# Patient Record
Sex: FEMALE | Race: WHITE | Employment: OTHER | URBAN - METROPOLITAN AREA
[De-identification: names, ages, dates, MRNs, and addresses within clinical notes are randomized per-mention and may not be internally consistent; named-entity substitution may affect disease eponyms.]

---

## 2017-01-09 ENCOUNTER — OFFICE VISIT (OUTPATIENT)
Dept: INTERNAL MEDICINE CLINIC | Age: 67
End: 2017-01-09

## 2017-01-09 VITALS
HEIGHT: 65 IN | RESPIRATION RATE: 18 BRPM | OXYGEN SATURATION: 96 % | HEART RATE: 88 BPM | WEIGHT: 204 LBS | BODY MASS INDEX: 33.99 KG/M2 | TEMPERATURE: 98.5 F | SYSTOLIC BLOOD PRESSURE: 142 MMHG | DIASTOLIC BLOOD PRESSURE: 90 MMHG

## 2017-01-09 DIAGNOSIS — E78.2 MIXED HYPERLIPIDEMIA: Primary | ICD-10-CM

## 2017-01-09 DIAGNOSIS — R09.81 NASAL CONGESTION: ICD-10-CM

## 2017-01-09 DIAGNOSIS — I10 ESSENTIAL HYPERTENSION, BENIGN: ICD-10-CM

## 2017-01-09 DIAGNOSIS — F34.1 DYSTHYMIA: ICD-10-CM

## 2017-01-09 DIAGNOSIS — L30.9 DERMATITIS: ICD-10-CM

## 2017-01-09 DIAGNOSIS — C20 RECTAL CANCER (HCC): ICD-10-CM

## 2017-01-09 RX ORDER — FLUTICASONE PROPIONATE 50 MCG
2 SPRAY, SUSPENSION (ML) NASAL
Qty: 1 BOTTLE | COMMUNITY
Start: 2017-01-09 | End: 2017-04-19 | Stop reason: SDUPTHER

## 2017-01-09 RX ORDER — MOMETASONE FUROATE 1 MG/G
CREAM TOPICAL DAILY
Qty: 15 G | Refills: 3 | Status: SHIPPED | OUTPATIENT
Start: 2017-01-09 | End: 2018-06-11 | Stop reason: SDUPTHER

## 2017-01-09 RX ORDER — GLIPIZIDE 10 MG/1
TABLET, FILM COATED, EXTENDED RELEASE ORAL
Qty: 30 TAB | Refills: 11 | Status: SHIPPED | OUTPATIENT
Start: 2017-01-09 | End: 2017-01-11 | Stop reason: SDUPTHER

## 2017-01-09 RX ORDER — PIOGLITAZONEHYDROCHLORIDE 15 MG/1
15 TABLET ORAL DAILY
Qty: 30 TAB | Refills: 10 | Status: SHIPPED | OUTPATIENT
Start: 2017-01-09 | End: 2017-01-11 | Stop reason: SDUPTHER

## 2017-01-09 NOTE — MR AVS SNAPSHOT
Visit Information Date & Time Provider Department Dept. Phone Encounter #  
 1/9/2017  3:25 PM Neo Amaya, [address] [facility] Internal Medicine [phone] [id number] Upcoming Health Maintenance Date Due Hepatitis C Screening 1950 DTaP/Tdap/Td series (1 - Tdap) 9/25/1972 ZOSTER VACCINE AGE 60> 9/25/2011 MICROALBUMIN Q1 9/23/2014 LIPID PANEL Q1 9/23/2014 HEMOGLOBIN A1C Q6M 9/26/2014 INFLUENZA AGE 9 TO ADULT 8/1/2016 OSTEOPOROSIS SCREENING (DEXA) 9/25/2016 Pneumococcal 65+ High/Highest Risk (1 of 2 - PCV13) 9/25/2016 MEDICARE YEARLY EXAM 9/25/2016 FOOT EXAM Q1 10/29/2016 EYE EXAM RETINAL OR DILATED Q1 11/2/2016 BREAST CANCER SCRN MAMMOGRAM 5/12/2017 GLAUCOMA SCREENING Q2Y 11/2/2017 COLONOSCOPY 10/18/2026 Allergies as of 1/9/2017  Review Complete On: 1/9/2017 By: Neo Amaya MD  
  
 Severity Noted Reaction Type Reactions Clindamycin  03/10/2010    Other (comments)  
 colitis Codeine  09/25/2009    Other (comments)  
 mental  
 Crestor [Rosuvastatin]  09/29/2009    Myalgia Metformin  10/03/2013    Diarrhea Pcn [Penicillins]  09/25/2009    Hives Vicodin [Hydrocodone-acetaminophen]  09/25/2009    Other (comments)  
 hallucinations Current Immunizations  Never Reviewed No immunizations on file. Not reviewed this visit You Were Diagnosed With   
  
 Codes Comments Mixed hyperlipidemia    -  Primary ICD-10-CM: D31.2 ICD-9-CM: 272.2 Essential hypertension, benign     ICD-10-CM: I10 
ICD-9-CM: 401.1 Uncontrolled type 2 diabetes mellitus without complication, without long-term current use of insulin ([facility]     ICD-10-CM: E11.65 ICD-9-CM: 250.02 Dermatitis     ICD-10-CM: L30.9 ICD-9-CM: 692.9 Rectal cancer ([facility]     ICD-10-CM: C20 
ICD-9-CM: 154.1 Dysthymia     ICD-10-CM: F34.1 ICD-9-CM: 300.4 Nasal congestion     ICD-10-CM: R09.81 ICD-9-CM: 478.19 Vitals BP Pulse Temp Resp Height(growth percentile) Weight(growth percentile) 142/90 88 98.5 °F (36.9 °C) (Oral) 18 5' 5\" (1.651 m) 204 lb (92.5 kg) SpO2 BMI OB Status Smoking Status 96% 33.95 kg/m2 Postmenopausal Former Smoker BMI and BSA Data Body Mass Index Body Surface Area 33.95 kg/m 2 2.06 m 2 Preferred Pharmacy Pharmacy Name Phone [address], [facility] [place] [address] [phone] Your Updated Medication List  
  
   
This list is accurate as of: 1/9/17  4:14 PM.  Always use your most recent med list.  
  
  
  
  
 ALPRAZolam 0.5 mg tablet Commonly known as:  XANAX  
TAKE 1 TABLET BY MOUTH THREE TIMES DAILY AS NEEDED FOR ANXIETY  
  
 amitriptyline 100 mg tablet Commonly known as:  ELAVIL TAKE ONE TABLET AT BEDTIME  
  
 amLODIPine-benazepril 5-20 mg per capsule Commonly known as:  LOTREL  
TAKE 1 CAPSULE BY MOUTH DAILY  
  
 aspirin delayed-release 81 mg tablet Take 81 mg by mouth. 2 po qd  
  
 butalbital-acetaminophen-caffeine -40 mg per tablet Commonly known as:  FIORICET, ESGIC  
TAKE 1 TABLET BY MOUTH EVERY 4 HOURS AS NEEDED FOR HEADACHE  
  
 dicyclomine 10 mg capsule Commonly known as:  BENTYL Take 10 mg by mouth daily. escitalopram oxalate 10 mg tablet Commonly known as:  [place] TAKE 1 TABLET BY MOUTH DAILY  
  
 fluticasone 50 mcg/actuation nasal spray Commonly known as:  Meera Shames 2 Sprays by Both Nostrils route daily as needed for Rhinitis. glipiZIDE SR 10 mg CR tablet Commonly known as:  GLUCOTROL XL  
TAKE ONE TABLET BY MOUTH DAILY  
  
 mometasone 0.1 % topical cream  
Commonly known as:  Zara July Apply  to affected area daily. multivitamin,tx-iron-ca-min 27-0.4 mg Tab Commonly known as:  THERA-M w/ IRON Take 1 Tab by mouth daily. omega-3 fatty acids-vitamin e 1,000 mg Cap Commonly known as:  FISH OIL  
 Take 1,000 mg by mouth four (4) times daily. pioglitazone 15 mg tablet Commonly known as:  ACTOS Take 1 Tab by mouth daily. For diabetes Prescriptions Printed Refills  
 pioglitazone (ACTOS) 15 mg tablet 10 Sig: Take 1 Tab by mouth daily. For diabetes Class: Print Route: Oral  
 glipiZIDE SR (GLUCOTROL XL) 10 mg CR tablet 11 Sig: TAKE ONE TABLET BY MOUTH DAILY Class: Print  
 mometasone (ELOCON) 0.1 % topical cream 3 Sig: Apply  to affected area daily. Class: Print Route: Topical  
  
We Performed the Following CBC WITH AUTOMATED DIFF [70093 CPT(R)] HEMOGLOBIN A1C WITH EAG [93843 CPT(R)] HEPATIC FUNCTION PANEL [69121 CPT(R)] LIPID PANEL [79577 CPT(R)] METABOLIC PANEL, BASIC [60750 CPT(R)] TSH 3RD GENERATION [81039 CPT(R)] URINALYSIS W/ RFLX MICROSCOPIC [99032 CPT(R)] Introducing [address]! [facility] introduces [email or web address] patient portal. Now you can access parts of your medical record, email your doctor's office, and request medication refills online. 1. In your internet browser, go to [email or web address] 2. Click on the First Time User? Click Here link in the Sign In box. You will see the New Member Sign Up page. 3. Enter your [email or web address] Access Code exactly as it appears below. You will not need to use this code after youve completed the sign-up process. If you do not sign up before the expiration date, you must request a new code. · [email or web address] Access Code: [email or web address] Expires: 1/16/2017  9:01 AM 
 
4. Enter the last four digits of your Social Security Number (xxxx) and Date of Birth (mm/dd/yyyy) as indicated and click Submit. You will be taken to the next sign-up page. 5. Create a Actixt ID. This will be your [email or web address] login ID and cannot be changed, so think of one that is secure and easy to remember. 6. Create a Actixt password. You can change your password at any time. 7. Enter your Password Reset Question and Answer. This can be used at a later time if you forget your password. 8. Enter your e-mail address. You will receive e-mail notification when new information is available in [address] 9. Click Sign Up. You can now view and download portions of your medical record. 10. Click the Download Summary menu link to download a portable copy of your medical information. If you have questions, please visit the Frequently Asked Questions section of the [employer] website. Remember, [employer]ar is NOT to be used for urgent needs. For medical emergencies, dial 911. Now available from your iPhone and Android! Please provide this summary of care documentation to your next provider. Your primary care clinician is listed as CHRIS BARRAZA. If you have any questions after today's visit, please call [phone].

## 2017-01-09 NOTE — PROGRESS NOTES
HISTORY OF PRESENT ILLNESS  Isatu Spears is a 73 y.o. female. HPI Kenyetta is seen today after a long absence for follow up of hypertension, diabetes, and other concerns. 1. Ventral hernia. This bothers her somewhat but not debilitating. Due to the extent of surgery that would be required, she declines treatment for now. 2. Essential hypertension. Fair readings, will follow for now. 3. Mixed hyperlipidemia, Diabetes type 2 uncontrolled without complication without long term insulin usage. Overdue for endocrinology follow up as well as for cholesterol recheck. Strongly encouraged her to get labs done and follow up as directed. 4. Preventive medicine. Up to date with gyn care and bone density study as well as colonoscopy. Review of systems notable for her feeling good. Social history notable for her being very pleased with her new [employer] business, [employer]. Her  Morgan Hwang helps her with the business. MedDATA/gwo         Review of Systems   Constitutional: Positive for weight loss. Respiratory: Negative. Cardiovascular: Negative for chest pain, palpitations, leg swelling and PND. Musculoskeletal: Negative for myalgias. Neurological: Negative for focal weakness. Physical Exam   Constitutional: She appears well-nourished. Neck: Carotid bruit is not present. Cardiovascular: Normal rate and regular rhythm. Exam reveals no gallop and no friction rub. No murmur heard. Pulmonary/Chest: Effort normal and breath sounds normal. No respiratory distress. Musculoskeletal: She exhibits no edema. Nursing note and vitals reviewed. ASSESSMENT and PLAN  Padmini Stauffer was seen today for hypertension and cholesterol problem.     Diagnoses and all orders for this visit:    Mixed hyperlipidemia  -     CBC WITH AUTOMATED DIFF  -     LIPID PANEL  -     HEPATIC FUNCTION PANEL  -     TSH 3RD GENERATION    Essential hypertension, benign- follow    Uncontrolled type 2 diabetes mellitus without complication, without long-term current use of insulin ([facility])  -     METABOLIC PANEL, BASIC  -     URINALYSIS W/ RFLX MICROSCOPIC  -     pioglitazone (ACTOS) 15 mg tablet; Take 1 Tab by mouth daily. For diabetes  -     glipiZIDE SR (GLUCOTROL XL) 10 mg CR tablet; TAKE ONE TABLET BY MOUTH DAILY  -     HEMOGLOBIN A1C WITH EAG    Dermatitis  -     mometasone (ELOCON) 0.1 % topical cream; Apply  to affected area daily. Rectal cancer [facility])- See specialists  as directed. Dysthymia - Continue current regimen of prescription and / or OTC medications     Nasal congestion- reviewed OTC    Other orders  -     Cancel: VITAMIN D, 25 HYDROXY    This has been fully explained to the patient, who indicates understanding.

## 2017-01-11 RX ORDER — GLIPIZIDE 10 MG/1
TABLET, FILM COATED, EXTENDED RELEASE ORAL
Qty: 90 TAB | Refills: 3 | Status: SHIPPED | OUTPATIENT
Start: 2017-01-11 | End: 2017-04-19 | Stop reason: SDUPTHER

## 2017-01-11 RX ORDER — PIOGLITAZONEHYDROCHLORIDE 15 MG/1
TABLET ORAL
Qty: 90 TAB | Refills: 3 | Status: SHIPPED | OUTPATIENT
Start: 2017-01-11 | End: 2017-04-19 | Stop reason: SDUPTHER

## 2017-01-12 DIAGNOSIS — F34.1 DYSTHYMIA: ICD-10-CM

## 2017-01-12 RX ORDER — ESCITALOPRAM OXALATE 10 MG/1
TABLET ORAL
Qty: 30 TAB | Refills: 5 | Status: SHIPPED | OUTPATIENT
Start: 2017-01-12 | End: 2017-04-19 | Stop reason: SDUPTHER

## 2017-02-06 DIAGNOSIS — Z23 ENCOUNTER FOR IMMUNIZATION: Primary | ICD-10-CM

## 2017-02-06 DIAGNOSIS — Z11.59 NEED FOR HEPATITIS C SCREENING TEST: ICD-10-CM

## 2017-03-23 DIAGNOSIS — F34.1 DYSTHYMIA: ICD-10-CM

## 2017-03-24 RX ORDER — AMITRIPTYLINE HYDROCHLORIDE 100 MG/1
TABLET, FILM COATED ORAL
Qty: 90 TAB | Refills: 11 | Status: SHIPPED | OUTPATIENT
Start: 2017-03-24 | End: 2017-04-19 | Stop reason: SDUPTHER

## 2017-03-24 RX ORDER — AMITRIPTYLINE HYDROCHLORIDE 100 MG/1
TABLET, FILM COATED ORAL
Qty: 30 TAB | Refills: 11 | Status: SHIPPED | OUTPATIENT
Start: 2017-03-24 | End: 2017-03-24 | Stop reason: SDUPTHER

## 2017-03-24 RX ORDER — ALPRAZOLAM 0.5 MG/1
TABLET ORAL
Qty: 20 TAB | Refills: 0 | OUTPATIENT
Start: 2017-03-24 | End: 2017-06-15 | Stop reason: SDUPTHER

## 2017-03-24 NOTE — TELEPHONE ENCOUNTER
Phoned in prescription below to patients pharmacy on file - verbal order received : Dr Simon Templeton.

## 2017-04-05 RX ORDER — BUTALBITAL, ACETAMINOPHEN AND CAFFEINE 50; 325; 40 MG/1; MG/1; MG/1
TABLET ORAL
Qty: 30 TAB | Refills: 0 | OUTPATIENT
Start: 2017-04-05 | End: 2017-09-20 | Stop reason: SDUPTHER

## 2017-04-05 NOTE — TELEPHONE ENCOUNTER
Phoned in prescription below to patients pharmacy on file - verbal order received : Dr Barrington Leggett.

## 2017-04-19 DIAGNOSIS — R09.81 NASAL CONGESTION: ICD-10-CM

## 2017-04-19 DIAGNOSIS — E78.5 OTHER AND UNSPECIFIED HYPERLIPIDEMIA: ICD-10-CM

## 2017-04-19 DIAGNOSIS — F34.1 DYSTHYMIA: ICD-10-CM

## 2017-04-19 RX ORDER — GLIPIZIDE 10 MG/1
TABLET, FILM COATED, EXTENDED RELEASE ORAL
Qty: 90 TAB | Refills: 3 | Status: SHIPPED | OUTPATIENT
Start: 2017-04-19 | End: 2018-02-14 | Stop reason: SDUPTHER

## 2017-04-19 RX ORDER — AMLODIPINE AND BENAZEPRIL HYDROCHLORIDE 5; 20 MG/1; MG/1
CAPSULE ORAL
Qty: 90 CAP | Refills: 3 | Status: SHIPPED | OUTPATIENT
Start: 2017-04-19 | End: 2017-09-15 | Stop reason: SDUPTHER

## 2017-04-19 RX ORDER — ESCITALOPRAM OXALATE 10 MG/1
TABLET ORAL
Qty: 90 TAB | Refills: 3 | Status: SHIPPED | OUTPATIENT
Start: 2017-04-19 | End: 2017-09-24 | Stop reason: SDUPTHER

## 2017-04-19 RX ORDER — AMITRIPTYLINE HYDROCHLORIDE 100 MG/1
TABLET, FILM COATED ORAL
Qty: 90 TAB | Refills: 3 | Status: SHIPPED | OUTPATIENT
Start: 2017-04-19 | End: 2017-05-25 | Stop reason: SDUPTHER

## 2017-04-19 RX ORDER — PIOGLITAZONEHYDROCHLORIDE 15 MG/1
TABLET ORAL
Qty: 90 TAB | Refills: 3 | Status: SHIPPED | OUTPATIENT
Start: 2017-04-19 | End: 2018-04-21 | Stop reason: SDUPTHER

## 2017-04-19 RX ORDER — FLUTICASONE PROPIONATE 50 MCG
2 SPRAY, SUSPENSION (ML) NASAL
Qty: 3 BOTTLE | Refills: 3 | Status: SHIPPED | OUTPATIENT
Start: 2017-04-19 | End: 2017-09-20 | Stop reason: ALTCHOICE

## 2017-05-25 RX ORDER — AMITRIPTYLINE HYDROCHLORIDE 100 MG/1
TABLET, FILM COATED ORAL
Qty: 30 TAB | Refills: 10 | Status: SHIPPED | OUTPATIENT
Start: 2017-05-25 | End: 2018-04-21 | Stop reason: SDUPTHER

## 2017-06-15 DIAGNOSIS — F34.1 DYSTHYMIA: ICD-10-CM

## 2017-06-16 RX ORDER — ALPRAZOLAM 0.5 MG/1
TABLET ORAL
Qty: 20 TAB | Refills: 0 | OUTPATIENT
Start: 2017-06-16 | End: 2017-09-20 | Stop reason: SDUPTHER

## 2017-06-16 NOTE — TELEPHONE ENCOUNTER
Phoned in prescription below to patients pharmacy on file - verbal order received : Dr Sonu Ballard.

## 2017-09-18 RX ORDER — AMLODIPINE AND BENAZEPRIL HYDROCHLORIDE 5; 20 MG/1; MG/1
CAPSULE ORAL
Qty: 90 CAP | Refills: 0 | Status: SHIPPED | OUTPATIENT
Start: 2017-09-18 | End: 2017-09-20 | Stop reason: SDUPTHER

## 2017-09-18 NOTE — TELEPHONE ENCOUNTER
Left detailed message MD has approved refill request but she's way overdue for labs and follow up - last seen 1/9/17 - was due back in 3 months 4/2017. Please call to schedule in the near future.

## 2017-09-18 NOTE — TELEPHONE ENCOUNTER
Call- I've approved her medication but she's way overdue for labs and follow up , please schedule in the near future

## 2017-09-19 ENCOUNTER — TELEPHONE (OUTPATIENT)
Dept: INTERNAL MEDICINE CLINIC | Age: 67
End: 2017-09-19

## 2017-09-19 NOTE — TELEPHONE ENCOUNTER
Patient is returning missed call from [place]. Also, patient has made an appt with Dr. Rogerio Lara for 9.20.17.

## 2017-09-19 NOTE — TELEPHONE ENCOUNTER
Spoke with pt - states she wanted to let me know she has scheduled appt tomorrow 9/20/17 at 11:15 am.

## 2017-09-20 ENCOUNTER — HOSPITAL ENCOUNTER (OUTPATIENT)
Dept: LAB | Age: 67
Discharge: HOME | End: 2017-09-20
Payer: MEDICARE

## 2017-09-20 ENCOUNTER — OFFICE VISIT (OUTPATIENT)
Dept: INTERNAL MEDICINE CLINIC | Age: 67
End: 2017-09-20

## 2017-09-20 VITALS
TEMPERATURE: 98.6 F | HEIGHT: 65 IN | RESPIRATION RATE: 16 BRPM | OXYGEN SATURATION: 95 % | DIASTOLIC BLOOD PRESSURE: 96 MMHG | BODY MASS INDEX: 35.69 KG/M2 | HEART RATE: 92 BPM | WEIGHT: 214.2 LBS | SYSTOLIC BLOOD PRESSURE: 150 MMHG

## 2017-09-20 DIAGNOSIS — Z23 ENCOUNTER FOR IMMUNIZATION: ICD-10-CM

## 2017-09-20 DIAGNOSIS — F34.1 DYSTHYMIA: ICD-10-CM

## 2017-09-20 DIAGNOSIS — Z01.00 DIABETIC EYE EXAM (HCC): ICD-10-CM

## 2017-09-20 DIAGNOSIS — G43.709 CHRONIC MIGRAINE WITHOUT AURA WITHOUT STATUS MIGRAINOSUS, NOT INTRACTABLE: ICD-10-CM

## 2017-09-20 DIAGNOSIS — E78.2 MIXED HYPERLIPIDEMIA: ICD-10-CM

## 2017-09-20 DIAGNOSIS — Z11.59 NEED FOR HEPATITIS C SCREENING TEST: ICD-10-CM

## 2017-09-20 DIAGNOSIS — C20 RECTAL CANCER (HCC): ICD-10-CM

## 2017-09-20 DIAGNOSIS — I10 ESSENTIAL HYPERTENSION, BENIGN: ICD-10-CM

## 2017-09-20 DIAGNOSIS — E11.9 DIABETIC EYE EXAM (HCC): ICD-10-CM

## 2017-09-20 DIAGNOSIS — K76.89 OTHER CHRONIC NONALCOHOLIC LIVER DISEASE: ICD-10-CM

## 2017-09-20 DIAGNOSIS — Z78.0 POSTMENOPAUSE: ICD-10-CM

## 2017-09-20 DIAGNOSIS — E11.9 ENCOUNTER FOR DIABETIC FOOT EXAM (HCC): ICD-10-CM

## 2017-09-20 PROCEDURE — 85025 COMPLETE CBC W/AUTO DIFF WBC: CPT

## 2017-09-20 PROCEDURE — 36415 COLL VENOUS BLD VENIPUNCTURE: CPT

## 2017-09-20 PROCEDURE — 82043 UR ALBUMIN QUANTITATIVE: CPT

## 2017-09-20 PROCEDURE — 81003 URINALYSIS AUTO W/O SCOPE: CPT

## 2017-09-20 PROCEDURE — 86803 HEPATITIS C AB TEST: CPT

## 2017-09-20 PROCEDURE — 84443 ASSAY THYROID STIM HORMONE: CPT

## 2017-09-20 PROCEDURE — 80053 COMPREHEN METABOLIC PANEL: CPT

## 2017-09-20 PROCEDURE — 80061 LIPID PANEL: CPT

## 2017-09-20 PROCEDURE — 83036 HEMOGLOBIN GLYCOSYLATED A1C: CPT

## 2017-09-20 RX ORDER — ALPRAZOLAM 0.5 MG/1
TABLET ORAL
Qty: 20 TAB | Refills: 2 | Status: SHIPPED | OUTPATIENT
Start: 2017-09-20 | End: 2018-06-05 | Stop reason: SDUPTHER

## 2017-09-20 RX ORDER — BUTALBITAL, ACETAMINOPHEN AND CAFFEINE 50; 325; 40 MG/1; MG/1; MG/1
TABLET ORAL
Qty: 30 TAB | Refills: 3 | Status: SHIPPED | OUTPATIENT
Start: 2017-09-20 | End: 2019-03-13 | Stop reason: SDUPTHER

## 2017-09-20 RX ORDER — AMLODIPINE AND BENAZEPRIL HYDROCHLORIDE 5; 20 MG/1; MG/1
CAPSULE ORAL
Qty: 90 CAP | Refills: 3 | Status: SHIPPED | OUTPATIENT
Start: 2017-09-20 | End: 2018-10-10 | Stop reason: SDUPTHER

## 2017-09-20 NOTE — MR AVS SNAPSHOT
Visit Information Date & Time Provider Department Dept. Phone Encounter #  
 9/20/2017 11:15 AM Yary Segovia MD [facility] Internal Medicine [phone] [id number] Follow-up Instructions Return in 3 months (on 12/20/2017). Upcoming Health Maintenance Date Due Hepatitis C Screening 1950 DTaP/Tdap/Td series (1 - Tdap) 9/25/1972 MICROALBUMIN Q1 9/23/2014 LIPID PANEL Q1 9/23/2014 HEMOGLOBIN A1C Q6M 9/26/2014 OSTEOPOROSIS SCREENING (DEXA) 9/25/2016 Pneumococcal 65+ High/Highest Risk (1 of 2 - PCV13) 9/25/2016 MEDICARE YEARLY EXAM 9/25/2016 EYE EXAM RETINAL OR DILATED Q1 11/2/2016 BREAST CANCER SCRN MAMMOGRAM 5/12/2017 GLAUCOMA SCREENING Q2Y 11/2/2017 FOOT EXAM Q1 9/20/2018 COLONOSCOPY 10/18/2026 Allergies as of 9/20/2017  Review Complete On: 9/20/2017 By: Yary Segovia MD  
  
 Severity Noted Reaction Type Reactions Clindamycin  03/10/2010    Other (comments)  
 colitis Codeine  09/25/2009    Other (comments)  
 mental  
 Crestor [Rosuvastatin]  09/29/2009    Myalgia Metformin  10/03/2013    Diarrhea Pcn [Penicillins]  09/25/2009    Hives Vicodin [Hydrocodone-acetaminophen]  09/25/2009    Other (comments)  
 hallucinations Current Immunizations  Reviewed on 9/20/2017 Name Date Pneumococcal Polysaccharide (PPSV-23)  Incomplete Zoster Vaccine, Live 1/1/2013 Reviewed by Yary Segovia MD on 9/20/2017 at 12:04 PM  
You Were Diagnosed With   
  
 Codes Comments Uncontrolled type 2 diabetes mellitus without complication, without long-term current use of insulin ([facility]    -  Primary ICD-10-CM: E11.65 ICD-9-CM: 250.02 Need for hepatitis C screening test     ICD-10-CM: Z11.59 
ICD-9-CM: V73.89 Mixed hyperlipidemia     ICD-10-CM: E78.2 ICD-9-CM: 272.2 Encounter for diabetic foot exam ([facility]     ICD-10-CM: E11.9 ICD-9-CM: 250.00 Postmenopause     ICD-10-CM: Z78.0 ICD-9-CM: V49.81 Dysthymia     ICD-10-CM: F34.1 ICD-9-CM: 300.4 Rectal cancer ([facility]     ICD-10-CM: C20 
ICD-9-CM: 154.1 Essential hypertension, benign     ICD-10-CM: I10 
ICD-9-CM: 401.1 Other chronic nonalcoholic liver disease     ICD-10-CM: K76.89 
ICD-9-CM: 571.8 Chronic migraine without aura without status migrainosus, not intractable     ICD-10-CM: I58.412 ICD-9-CM: 346.70 [facility]abetic [facility] exam ([facility]     ICD-10-CM: E11.9, Z01.00 ICD-9-CM: V72.0, 250.00 Encounter for immunization     ICD-10-CM: O99 ICD-9-CM: V03.89 Vitals BP Pulse Temp Resp Height(growth percentile) Weight(growth percentile) (!) 150/98 (BP 1 Location: Right arm, BP Patient Position: Sitting) 92 98.6 °F (37 °C) (Oral) 16 5' 5\" (1.651 m) 214 lb 3.2 oz (97.2 kg) SpO2 BMI OB Status Smoking Status 95% 35.64 kg/m2 Postmenopausal Former Smoker BMI and BSA Data Body Mass Index Body Surface Area  
 35.64 kg/m 2 2.11 m 2 Preferred Pharmacy Pharmacy Name Phone [facility] [address] [facility] [place], [address] [facility] [phone] Your Updated Medication List  
  
   
This list is accurate as of: 9/20/17 12:08 PM.  Always use your most recent med list.  
  
  
  
  
 ALPRAZolam 0.5 mg tablet Commonly known as:  XANAX  
TAKE 1 TABLET BY MOUTH THREE TIMES DAILY AS NEEDED FOR ANXIETY  
  
 amitriptyline 100 mg tablet Commonly known as:  ELAVIL TAKE ONE TABLET BY MOUTH AT BEDTIME  
  
 amLODIPine-benazepril 5-20 mg per capsule Commonly known as:  LOTREL  
TAKE ONE CAPSULE BY MOUTH DAILY  
  
 aspirin delayed-release 81 mg tablet Take 81 mg by mouth. 2 po qd  
  
 butalbital-acetaminophen-caffeine -40 mg per tablet Commonly known as:  FIORICET, ESGIC  
TAKE 1 TABLET BY MOUTH EVERY 4 HOURS AS NEEDED FOR HEADACHE  
  
 dicyclomine 10 mg capsule Commonly known as:  BENTYL Take 10 mg by mouth daily. escitalopram oxalate 10 mg tablet Commonly known as:  Fransisca Hastings TAKE 1 TABLET BY MOUTH DAILY  
  
 glipiZIDE SR 10 mg CR tablet Commonly known as:  GLUCOTROL XL  
TAKE 1 TABLET BY MOUTH DAILY  
  
 mometasone 0.1 % topical cream  
Commonly known as:  Jackquline Mattock Apply  to affected area daily. omega-3 fatty acids-vitamin e 1,000 mg Cap Commonly known as:  FISH OIL Take 1,000 mg by mouth four (4) times daily. pioglitazone 15 mg tablet Commonly known as:  ACTOS  
TAKE 1 TABLET BY MOUTH DAILY XYZAL PO Take  by mouth as needed. Prescriptions Printed Refills  
 amLODIPine-benazepril (LOTREL) 5-20 mg per capsule 3 Sig: TAKE ONE CAPSULE BY MOUTH DAILY Class: Print ALPRAZolam (XANAX) 0.5 mg tablet 2 Sig: TAKE 1 TABLET BY MOUTH THREE TIMES DAILY AS NEEDED FOR ANXIETY Class: Print  
 butalbital-acetaminophen-caffeine (FIORICET, ESGIC) -40 mg per tablet 3 Sig: TAKE 1 TABLET BY MOUTH EVERY 4 HOURS AS NEEDED FOR HEADACHE Class: Print We Performed the Following CBC WITH AUTOMATED DIFF [84692 CPT(R)] HEMOGLOBIN A1C WITH EAG [08350 CPT(R)] HEPATITIS C AB [75331 CPT(R)]  DIABETES FOOT EXAM [HM7 Custom] LIPID PANEL [98379 CPT(R)] METABOLIC PANEL, COMPREHENSIVE [88243 CPT(R)] MICROALBUMIN, UR, RAND W/ MICROALBUMIN/CREA RATIO C6090375 CPT(R)] PNEUMOCOCCAL POLYSACCHARIDE VACCINE, 23-VALENT, ADULT OR IMMUNOSUPPRESSED PT DOSE, [92885 CPT(R)] REFERRAL TO OPHTHALMOLOGY [REF57 Custom] Comments:  
 Please evaluate patient for glacoma screening. TSH 3RD GENERATION [64003 CPT(R)] URINALYSIS W/ RFLX MICROSCOPIC [10826 CPT(R)] Follow-up Instructions Return in 3 months (on 12/20/2017). To-Do List   
 09/22/2017 Imaging:  DEXA BONE DENSITY STUDY AXIAL Referral Information Referral ID Referred By Referred To  
  
 [id number] CHRIS BARRAZA Not Available Visits Status Start Date End Date 1 New Request 9/20/17 9/20/18 If your referral has a status of pending review or denied, additional information will be sent to support the outcome of this decision. Introducing [facility]! Rosalieon Dina introduces [employer] patient portal. Now you can access parts of your medical record, email your doctor's office, and request medication refills online. 1. In your internet browser, go to [email or web address] 2. Click on the First Time User? Click Here link in the Sign In box. You will see the New Member Sign Up page. 3. Enter your Atlantium Access Code exactly as it appears below. You will not need to use this code after youve completed the sign-up process. If you do not sign up before the expiration date, you must request a new code. · Atlantium Access Code: [email or web address] Expires: 12/19/2017 10:33 AM 
 
4. Enter the last four digits of your Social Security Number (xxxx) and Date of Birth (mm/dd/yyyy) as indicated and click Submit. You will be taken to the next sign-up page. 5. Create a Atlantium ID. This will be your Atlantium login ID and cannot be changed, so think of one that is secure and easy to remember. 6. Create a Atlantium password. You can change your password at any time. 7. Enter your Password Reset Question and Answer. This can be used at a later time if you forget your password. 8. Enter your e-mail address. You will receive e-mail notification when new information is available in [address] 9. Click Sign Up. You can now view and download portions of your medical record. 10. Click the Download Summary menu link to download a portable copy of your medical information. If you have questions, please visit the Frequently Asked Questions section of the Atl[employer] website. Remember, [employer]ium is NOT to be used for urgent needs. For medical emergencies, dial 911. Now available from your iPhone and Android! Please provide this summary of care documentation to your next provider. Your primary care clinician is listed as CHRIS BARRAZA. If you have any questions after today's visit, please call [phone].

## 2017-09-20 NOTE — PROGRESS NOTES
HISTORY OF PRESENT ILLNESS  Ama Siegel is a 73 y.o. female. HPI Kenyetta is seen today for follow up of diabetes, hyperlipidemia and other concerns. 1. Diabetes, hyperlipidemia. She is markedly overdue for follow up. She denies any symptoms to suggest out of control diabetes. She is intolerant of statin medications. 2. Hypertension. Elevated readings today. We will check her status in 3 months. 3. Anxiety. Overall stable. 4. Colon cancer. Up to date with specialist follow up. She is 11 years out. She was diagnosed with fatty liver and is seeing a liver specialist.     Review of systems notable for some calf pain on the right. She has had two orthopedic evaluations. It is improved. Social history notable for her having significant worry about her  Escobar Sumner who is having lung cancer in the very near future. MedDATA/gwo       Lipid lowering therapy not prescibed for patient reasons. Review of Systems   Constitutional: Negative for weight loss. Respiratory: Negative. Cardiovascular: Negative for chest pain, palpitations, leg swelling and PND. Musculoskeletal: Positive for joint pain. Negative for myalgias. Neurological: Negative for focal weakness. Physical Exam   Constitutional: She appears well-nourished. No distress. Neck: Carotid bruit is not present. Cardiovascular: Normal rate and regular rhythm. Exam reveals no gallop and no friction rub. No murmur heard. Pulmonary/Chest: Effort normal and breath sounds normal. No respiratory distress. Musculoskeletal: She exhibits no edema. Feet without lesion or ulcer    Nursing note and vitals reviewed. ASSESSMENT and PLAN  Diagnoses and all orders for this visit:    1. Uncontrolled type 2 diabetes mellitus without complication, without long-term current use of insulin (HCC)  -     HEMOGLOBIN A1C WITH EAG  -     URINALYSIS W/ RFLX MICROSCOPIC  -     MICROALBUMIN, UR, RAND W/ MICROALBUMIN/CREA RATIO    2.  Need for hepatitis C screening test  -     HEPATITIS C AB    3. Mixed hyperlipidemia  -     METABOLIC PANEL, COMPREHENSIVE  -     CBC WITH AUTOMATED DIFF  -     LIPID PANEL  -     TSH 3RD GENERATION    4. Encounter for diabetic foot exam ([facility]  -      DIABETES FOOT EXAM    5. Postmenopause- Follow off treatment     6. Dysthymia  -     ALPRAZolam (XANAX) 0.5 mg tablet; TAKE 1 TABLET BY MOUTH THREE TIMES DAILY AS NEEDED FOR ANXIETY    7. Rectal cancer ([facility]- See specialists  as directed. 8. Essential hypertension, benign  -     amLODIPine-benazepril (LOTREL) 5-20 mg per capsule; TAKE ONE CAPSULE BY MOUTH DAILY    9. Other chronic nonalcoholic liver disease- See gastroenterologist as directed     10.  Chronic migraine without aura without status migrainosus, not intractable  -     butalbital-acetaminophen-caffeine (FIORICET, ESGIC) -40 mg per tablet; TAKE 1 TABLET BY MOUTH EVERY 4 HOURS AS NEEDED FOR HEADACHE    11. Diabetic eye exam ([facility]  -     REFERRAL TO OPHTHALMOLOGY    12. Encounter for immunization  -     Pneumococcal Polysaccharide vaccine, 23-Valent, Adult or Immunocompromised

## 2017-09-21 LAB
ALBUMIN SERPL-MCNC: 4.2 G/DL (ref 3.6–4.8)
ALBUMIN/CREAT UR: 12.3 MG/G CREAT (ref 0–30)
ALBUMIN/GLOB SERPL: 1.3 {RATIO} (ref 1.2–2.2)
ALP SERPL-CCNC: 92 IU/L (ref 39–117)
ALT SERPL-CCNC: 62 IU/L (ref 0–32)
APPEARANCE UR: CLEAR
AST SERPL-CCNC: 36 IU/L (ref 0–40)
BASOPHILS # BLD AUTO: 0 X10E3/UL (ref 0–0.2)
BASOPHILS NFR BLD AUTO: 0 %
BILIRUB SERPL-MCNC: 0.6 MG/DL (ref 0–1.2)
BILIRUB UR QL STRIP: NEGATIVE
BUN SERPL-MCNC: 11 MG/DL (ref 8–27)
BUN/CREAT SERPL: 14 (ref 12–28)
CALCIUM SERPL-MCNC: 9.5 MG/DL (ref 8.7–10.3)
CHLORIDE SERPL-SCNC: 99 MMOL/L (ref 96–106)
CHOLEST SERPL-MCNC: 276 MG/DL (ref 100–199)
CO2 SERPL-SCNC: 26 MMOL/L (ref 18–29)
COLOR UR: YELLOW
CREAT SERPL-MCNC: 0.81 MG/DL (ref 0.57–1)
CREAT UR-MCNC: 215.9 MG/DL
EOSINOPHIL # BLD AUTO: 0.3 X10E3/UL (ref 0–0.4)
EOSINOPHIL NFR BLD AUTO: 4 %
ERYTHROCYTE [DISTWIDTH] IN BLOOD BY AUTOMATED COUNT: 13.9 % (ref 12.3–15.4)
EST. AVERAGE GLUCOSE BLD GHB EST-MCNC: 148 MG/DL
GLOBULIN SER CALC-MCNC: 3.3 G/DL (ref 1.5–4.5)
GLUCOSE SERPL-MCNC: 134 MG/DL (ref 65–99)
GLUCOSE UR QL: NEGATIVE
HBA1C MFR BLD: 6.8 % (ref 4.8–5.6)
HCT VFR BLD AUTO: 40.4 % (ref 34–46.6)
HCV AB S/CO SERPL IA: <0.1 S/CO RATIO (ref 0–0.9)
HDLC SERPL-MCNC: 32 MG/DL
HGB BLD-MCNC: 13.8 G/DL (ref 11.1–15.9)
HGB UR QL STRIP: NEGATIVE
IMM GRANULOCYTES # BLD: 0 X10E3/UL (ref 0–0.1)
IMM GRANULOCYTES NFR BLD: 0 %
KETONES UR QL STRIP: NEGATIVE
LDLC SERPL CALC-MCNC: ABNORMAL MG/DL (ref 0–99)
LEUKOCYTE ESTERASE UR QL STRIP: NEGATIVE
LYMPHOCYTES # BLD AUTO: 1.6 X10E3/UL (ref 0.7–3.1)
LYMPHOCYTES NFR BLD AUTO: 23 %
MCH RBC QN AUTO: 31 PG (ref 26.6–33)
MCHC RBC AUTO-ENTMCNC: 34.2 G/DL (ref 31.5–35.7)
MCV RBC AUTO: 91 FL (ref 79–97)
MICRO URNS: NORMAL
MICROALBUMIN UR-MCNC: 26.6 UG/ML
MONOCYTES # BLD AUTO: 0.3 X10E3/UL (ref 0.1–0.9)
MONOCYTES NFR BLD AUTO: 5 %
NEUTROPHILS # BLD AUTO: 4.6 X10E3/UL (ref 1.4–7)
NEUTROPHILS NFR BLD AUTO: 68 %
NITRITE UR QL STRIP: NEGATIVE
PH UR STRIP: 6 [PH] (ref 5–7.5)
PLATELET # BLD AUTO: 281 X10E3/UL (ref 150–379)
POTASSIUM SERPL-SCNC: 4.5 MMOL/L (ref 3.5–5.2)
PROT SERPL-MCNC: 7.5 G/DL (ref 6–8.5)
PROT UR QL STRIP: NORMAL
RBC # BLD AUTO: 4.45 X10E6/UL (ref 3.77–5.28)
SODIUM SERPL-SCNC: 139 MMOL/L (ref 134–144)
SP GR UR: 1.03 (ref 1–1.03)
TRIGL SERPL-MCNC: 427 MG/DL (ref 0–149)
TSH SERPL DL<=0.005 MIU/L-ACNC: 1.23 UIU/ML (ref 0.45–4.5)
UROBILINOGEN UR STRIP-MCNC: 0.2 MG/DL (ref 0.2–1)
VLDLC SERPL CALC-MCNC: ABNORMAL MG/DL (ref 5–40)
WBC # BLD AUTO: 6.9 X10E3/UL (ref 3.4–10.8)

## 2017-09-24 DIAGNOSIS — F34.1 DYSTHYMIA: ICD-10-CM

## 2017-09-25 RX ORDER — ESCITALOPRAM OXALATE 10 MG/1
TABLET ORAL
Qty: 30 TAB | Refills: 0 | Status: SHIPPED | OUTPATIENT
Start: 2017-09-25 | End: 2017-11-17 | Stop reason: SDUPTHER

## 2017-09-26 ENCOUNTER — TELEPHONE (OUTPATIENT)
Dept: INTERNAL MEDICINE CLINIC | Age: 67
End: 2017-09-26

## 2017-09-26 RX ORDER — FENOFIBRATE 200 MG/1
CAPSULE ORAL
Qty: 30 CAP | Refills: 11 | Status: SHIPPED | OUTPATIENT
Start: 2017-09-26 | End: 2018-06-11 | Stop reason: SDUPTHER

## 2017-09-26 NOTE — PROGRESS NOTES
Called and spoke to the pt and informed Labs look great overall , including excellent diabetic control. Only problem is very high cholesterol and triglyceride. Start fenofibrate 200 mg every day, 30 with 11 rf, ov in 3 months to check. Pt states understanding [facility]

## 2017-09-26 NOTE — PROGRESS NOTES
Call- Labs look great overall , including excellent diabetic control. Only problem is very high cholesterol and triglyceride.  Start fenofibrate 200 mg every day, 30 with 11 rf, ov in 3 months to check status

## 2018-02-14 RX ORDER — GLIPIZIDE 10 MG/1
TABLET, FILM COATED, EXTENDED RELEASE ORAL
Qty: 90 TAB | Refills: 0 | Status: SHIPPED | OUTPATIENT
Start: 2018-02-14 | End: 2018-07-29 | Stop reason: SDUPTHER

## 2018-04-21 RX ORDER — PIOGLITAZONEHYDROCHLORIDE 15 MG/1
TABLET ORAL
Qty: 90 TAB | Refills: 1 | Status: SHIPPED | OUTPATIENT
Start: 2018-04-21 | End: 2019-01-11 | Stop reason: SDUPTHER

## 2018-04-21 RX ORDER — AMITRIPTYLINE HYDROCHLORIDE 100 MG/1
TABLET, FILM COATED ORAL
Qty: 30 TAB | Refills: 10 | Status: SHIPPED | OUTPATIENT
Start: 2018-04-21 | End: 2018-06-11 | Stop reason: SDUPTHER

## 2018-06-05 DIAGNOSIS — F34.1 DYSTHYMIA: ICD-10-CM

## 2018-06-06 RX ORDER — ALPRAZOLAM 0.5 MG/1
TABLET ORAL
Qty: 20 TAB | Refills: 1 | OUTPATIENT
Start: 2018-06-06 | End: 2018-11-13 | Stop reason: SDUPTHER

## 2018-06-06 NOTE — TELEPHONE ENCOUNTER
Phoned in prescription below to patients pharmacy on file - verbal order received : Dr Joslyn Dillard.

## 2018-06-11 ENCOUNTER — HOSPITAL ENCOUNTER (OUTPATIENT)
Dept: LAB | Age: 68
Discharge: HOME | End: 2018-06-11
Payer: MEDICARE

## 2018-06-11 ENCOUNTER — OFFICE VISIT (OUTPATIENT)
Dept: INTERNAL MEDICINE CLINIC | Age: 68
End: 2018-06-11

## 2018-06-11 VITALS
DIASTOLIC BLOOD PRESSURE: 80 MMHG | OXYGEN SATURATION: 95 % | BODY MASS INDEX: 33.45 KG/M2 | HEART RATE: 86 BPM | WEIGHT: 200.8 LBS | SYSTOLIC BLOOD PRESSURE: 120 MMHG | HEIGHT: 65 IN | TEMPERATURE: 99.3 F

## 2018-06-11 DIAGNOSIS — G43.709 CHRONIC MIGRAINE WITHOUT AURA WITHOUT STATUS MIGRAINOSUS, NOT INTRACTABLE: ICD-10-CM

## 2018-06-11 DIAGNOSIS — I10 ESSENTIAL HYPERTENSION, BENIGN: ICD-10-CM

## 2018-06-11 DIAGNOSIS — E66.01 SEVERE OBESITY (BMI 35.0-39.9): Primary | ICD-10-CM

## 2018-06-11 DIAGNOSIS — C20 RECTAL CANCER (HCC): ICD-10-CM

## 2018-06-11 DIAGNOSIS — R53.82 CHRONIC FATIGUE: ICD-10-CM

## 2018-06-11 DIAGNOSIS — E78.2 MIXED HYPERLIPIDEMIA: ICD-10-CM

## 2018-06-11 DIAGNOSIS — L30.9 DERMATITIS: ICD-10-CM

## 2018-06-11 PROCEDURE — 36415 COLL VENOUS BLD VENIPUNCTURE: CPT

## 2018-06-11 PROCEDURE — 82043 UR ALBUMIN QUANTITATIVE: CPT

## 2018-06-11 PROCEDURE — 80061 LIPID PANEL: CPT

## 2018-06-11 PROCEDURE — 85025 COMPLETE CBC W/AUTO DIFF WBC: CPT

## 2018-06-11 PROCEDURE — 83036 HEMOGLOBIN GLYCOSYLATED A1C: CPT

## 2018-06-11 PROCEDURE — 84443 ASSAY THYROID STIM HORMONE: CPT

## 2018-06-11 PROCEDURE — 81001 URINALYSIS AUTO W/SCOPE: CPT

## 2018-06-11 PROCEDURE — 80053 COMPREHEN METABOLIC PANEL: CPT

## 2018-06-11 RX ORDER — MOMETASONE FUROATE 1 MG/G
CREAM TOPICAL DAILY
Qty: 15 G | Refills: 3 | Status: SHIPPED | OUTPATIENT
Start: 2018-06-11 | End: 2020-03-16

## 2018-06-11 RX ORDER — AMITRIPTYLINE HYDROCHLORIDE 100 MG/1
TABLET, FILM COATED ORAL
Qty: 90 TAB | Refills: 3 | Status: SHIPPED | OUTPATIENT
Start: 2018-06-11 | End: 2019-01-11 | Stop reason: SDUPTHER

## 2018-06-11 RX ORDER — FENOFIBRATE 200 MG/1
CAPSULE ORAL
Qty: 90 CAP | Refills: 3 | Status: SHIPPED | OUTPATIENT
Start: 2018-06-11 | End: 2019-06-12 | Stop reason: SDUPTHER

## 2018-06-11 NOTE — MR AVS SNAPSHOT
[address] [place] [address] 
[phone] Patient: iDana Dias MRN: [id number] Visit Information Date & Time Provider Department Dept. Phone Encounter #  
 6/11/2018  2:20 PM Eusebia Guerra, [address] [place] [address] Internal Medicine [phone] [id number] Upcoming Health Maintenance Date Due DTaP/Tdap/Td series (1 - Tdap) 9/25/1972 EYE EXAM RETINAL OR DILATED Q1 11/2/2016 GLAUCOMA SCREENING Q2Y 11/2/2017 MEDICARE YEARLY EXAM 3/14/2018 HEMOGLOBIN A1C Q6M 3/20/2018 Influenza Age 5 to Adult 8/1/2018 Pneumococcal 65+ High/Highest Risk (2 of 2 - PCV13) 9/20/2018 FOOT EXAM Q1 9/20/2018 MICROALBUMIN Q1 9/20/2018 LIPID PANEL Q1 9/20/2018 BREAST CANCER SCRN MAMMOGRAM 7/13/2019 COLONOSCOPY 10/18/2026 Allergies as of 6/11/2018  Review Complete On: 6/11/2018 By: Eusebia Guerra MD  
  
 Severity Noted Reaction Type Reactions Clindamycin  03/10/2010    Other (comments)  
 colitis Codeine  09/25/2009    Other (comments)  
 mental  
 Crestor [Rosuvastatin]  09/29/2009    Myalgia Metformin  10/03/2013    Diarrhea Pcn [Penicillins]  09/25/2009    Hives Vicodin [Hydrocodone-acetaminophen]  09/25/2009    Other (comments)  
 hallucinations Current Immunizations  Reviewed on 9/20/2017 Name Date Pneumococcal Polysaccharide (PPSV-23) 9/20/2017 Zoster Vaccine, Live 1/1/2013 Not reviewed this visit You Were Diagnosed With   
  
 Codes Comments Severe obesity (BMI 35.0-39.9) (HCC)    -  Primary ICD-10-CM: E66.01 
ICD-9-CM: 278.01 Dermatitis     ICD-10-CM: L30.9 ICD-9-CM: 692.9 Uncontrolled type 2 diabetes mellitus without complication, without long-term current use of insulin ([facility]     ICD-10-CM: E11.65 ICD-9-CM: 250.02 Mixed hyperlipidemia     ICD-10-CM: E78.2 ICD-9-CM: 272.2 Rectal cancer ([address]     ICD-10-CM: C20 
ICD-9-CM: 154.1 Essential hypertension, benign     ICD-10-CM: I10 
ICD-9-CM: 141. 1 Chronic fatigue     ICD-10-CM: R53.82 
ICD-9-CM: 780.79 Chronic migraine without aura without status migrainosus, not intractable     ICD-10-CM: L71.380 ICD-9-CM: 346.70 Vitals BP Pulse Temp Height(growth percentile) Weight(growth percentile) SpO2  
 120/80 (BP 1 Location: Right arm, BP Patient Position: Sitting) 86 99.3 °F (37.4 °C) (Oral) 5' 5\" (1.651 m) 200 lb 12.8 oz (91.1 kg) 95% BMI OB Status Smoking Status 33.41 kg/m2 Postmenopausal Former Smoker Vitals History BMI and BSA Data Body Mass Index Body Surface Area  
 33.41 kg/m 2 2.04 m 2 Preferred Pharmacy Pharmacy Name Phone Nilesh Maloney [address] [place] [address], [address] [place] [address] [phone] Your Updated Medication List  
  
   
This list is accurate as of 6/11/18  3:09 PM.  Always use your most recent med list.  
  
  
  
  
 ALPRAZolam 0.5 mg tablet Commonly known as:  XANAX  
TAKE ONE TABLET BY MOUTH THREE TIMES A DAY AS NEEDED FOR ANXIETY  
  
 amitriptyline 100 mg tablet Commonly known as:  ELAVIL TAKE ONE TABLET BY MOUTH ONCE DAILY AT BEDTIME  
  
 amLODIPine-benazepril 5-20 mg per capsule Commonly known as:  LOTREL  
TAKE ONE CAPSULE BY MOUTH DAILY  
  
 aspirin delayed-release 81 mg tablet Take 81 mg by mouth. 2 po qd  
  
 butalbital-acetaminophen-caffeine -40 mg per tablet Commonly known as:  FIORICET, ESGIC  
TAKE 1 TABLET BY MOUTH EVERY 4 HOURS AS NEEDED FOR HEADACHE  
  
 dicyclomine 10 mg capsule Commonly known as:  BENTYL Take 10 mg by mouth daily. escitalopram oxalate 10 mg tablet Commonly known as:  Cy Null TAKE ONE TABLET BY MOUTH DAILY  
  
 fenofibrate micronized 200 mg capsule Commonly known as:  LOFIBRA Take 1 tab daily. glipiZIDE SR 10 mg CR tablet Commonly known as:  GLUCOTROL XL  
TAKE ONE TABLET BY MOUTH DAILY  
  
 mometasone 0.1 % topical cream  
 Commonly known as:  Alvena Calion Apply  to affected area daily. NASACORT AQ NA  
by Nasal route daily. omega-3 fatty acids-vitamin e 1,000 mg Cap Commonly known as:  FISH OIL Take 1,000 mg by mouth four (4) times daily. pioglitazone 15 mg tablet Commonly known as:  ACTOS  
TAKE ONE TABLET BY MOUTH DAILY Prescriptions Printed Refills  
 fenofibrate micronized (LOFIBRA) 200 mg capsule 3 Sig: Take 1 tab daily. Class: Print  
 amitriptyline (ELAVIL) 100 mg tablet 3 Sig: TAKE ONE TABLET BY MOUTH ONCE DAILY AT BEDTIME Class: Print  
 mometasone (ELOCON) 0.1 % topical cream 3 Sig: Apply  to affected area daily. Class: Print Route: Topical  
  
We Performed the Following CBC WITH AUTOMATED DIFF [03903 CPT(R)] HEMOGLOBIN A1C WITH EAG [52566 CPT(R)] LIPID PANEL [80657 CPT(R)] METABOLIC PANEL, COMPREHENSIVE [72054 CPT(R)] MICROALBUMIN, UR, RAND W/ MICROALB/CREAT RATIO C1806243 CPT(R)] TSH 3RD GENERATION [53633 CPT(R)] URINALYSIS W/ RFLX MICROSCOPIC [44895 CPT(R)] Introducing [facility]! [employer] introduces [employer] patient portal. Now you can access parts of your medical record, email your doctor's office, and request medication refills online. 1. In your internet browser, go to [email or web address] 2. Click on the First Time User? Click Here link in the Sign In box. You will see the New Member Sign Up page. 3. Enter your [employer] Access Code exactly as it appears below. You will not need to use this code after youve completed the sign-up process. If you do not sign up before the expiration date, you must request a new code. · [employer] Access Code: [email or web address] Expires: 9/9/2018  2:25 PM 
 
4. Enter the last four digits of your Social Security Number (xxxx) and Date of Birth (mm/dd/yyyy) as indicated and click Submit. You will be taken to the next sign-up page. 5. Create a [facility] ID. This will be your [facility] login ID and cannot be changed, so think of one that is secure and easy to remember. 6. Create a [facility] password. You can change your password at any time. 7. Enter your Password Reset Question and Answer. This can be used at a later time if you forget your password. 8. Enter your e-mail address. You will receive e-mail notification when new information is available in [address] 9. Click Sign Up. You can now view and download portions of your medical record. 10. Click the Download Summary menu link to download a portable copy of your medical information. If you have questions, please visit the Frequently Asked Questions section of the [facility] website. Remember, [facility] is NOT to be used for urgent needs. For medical emergencies, dial 911. Now available from your iPhone and Android! Please provide this summary of care documentation to your next provider. Your primary care clinician is listed as CHRIS BARRAZA. If you have any questions after today's visit, please call [phone].

## 2018-06-11 NOTE — PROGRESS NOTES
HISTORY OF PRESENT ILLNESS  Sonal Soriano is a 78 y.o. female. HPI Kenyetta is seen today overdue for routine follow up. Overall, she is doing fairly well. 1. Obesity, 35+ BMI. I strongly encouraged diet and exercise. 2. Rectal cancer. Up to date with specialist follow up and is felt to be in remission. 3. Diabetes, hyperlipidemia. Overdue for routine labs and follow up with her endocrinologist, Dr. Kurtis England. She is also off fenofibrate, I believe, by accident. 4. Hypertension. Stable on current regimen. Review of systems notable for increased abdominal pain when she is tired. She had a recent strep throat in April. She continued with fatigue on and off, but overall remains fairly functional.     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 Review of Systems   Constitutional: Positive for malaise/fatigue. Negative for weight loss. Respiratory: Negative. Cardiovascular: Negative for chest pain, palpitations, leg swelling and PND. Gastrointestinal: Positive for abdominal pain. Musculoskeletal: Negative for myalgias. Neurological: Negative for focal weakness. Physical Exam   Constitutional: She appears well-nourished. Neck: Carotid bruit is not present. Cardiovascular: Normal rate and regular rhythm. Exam reveals no gallop and no friction rub. No murmur heard. Pulmonary/Chest: Effort normal and breath sounds normal. No respiratory distress. Musculoskeletal: She exhibits no edema. Nursing note and vitals reviewed. ASSESSMENT and PLAN  Diagnoses and all orders for this visit:    1. Severe obesity (BMI 35.0-39.9) ([facility]- Diet and exercise     2. Dermatitis  -     mometasone (ELOCON) 0.1 % topical cream; Apply  to affected area daily.     3. Uncontrolled type 2 diabetes mellitus without complication, without long-term current use of insulin (HCC)  -     URINALYSIS W/ RFLX MICROSCOPIC  -     MICROALBUMIN, UR, RAND W/ MICROALB/CREAT RATIO  -     HEMOGLOBIN A1C WITH EAG    4. Mixed hyperlipidemia  -     fenofibrate micronized (LOFIBRA) 200 mg capsule; Take 1 tab daily.  -     LIPID PANEL  -     METABOLIC PANEL, COMPREHENSIVE  -     CBC WITH AUTOMATED DIFF  -     TSH 3RD GENERATION    5. Rectal cancer [facility])- See specialists  as directed. 6. Essential hypertension, benign- Continue current regimen of prescription and / or OTC medications     7. Chronic fatigue- follow, check routine labs as above    8.  Chronic migraine without aura without status migrainosus, not intractable  -     amitriptyline (ELAVIL) 100 mg tablet; TAKE ONE TABLET BY MOUTH ONCE DAILY AT BEDTIME    Other orders  -     MICROSCOPIC EXAMINATION

## 2018-06-12 LAB
ALBUMIN SERPL-MCNC: 4.5 G/DL (ref 3.6–4.8)
ALBUMIN/CREAT UR: 13.3 MG/G CREAT (ref 0–30)
ALBUMIN/GLOB SERPL: 1.3 {RATIO} (ref 1.2–2.2)
ALP SERPL-CCNC: 94 IU/L (ref 39–117)
ALT SERPL-CCNC: 78 IU/L (ref 0–32)
APPEARANCE UR: ABNORMAL
AST SERPL-CCNC: 42 IU/L (ref 0–40)
BACTERIA #/AREA URNS HPF: NORMAL /[HPF]
BASOPHILS # BLD AUTO: 0.1 X10E3/UL (ref 0–0.2)
BASOPHILS NFR BLD AUTO: 1 %
BILIRUB SERPL-MCNC: 0.7 MG/DL (ref 0–1.2)
BILIRUB UR QL STRIP: NEGATIVE
BUN SERPL-MCNC: 17 MG/DL (ref 8–27)
BUN/CREAT SERPL: 20 (ref 12–28)
CALCIUM SERPL-MCNC: 9.6 MG/DL (ref 8.7–10.3)
CASTS URNS QL MICRO: NORMAL /LPF
CHLORIDE SERPL-SCNC: 95 MMOL/L (ref 96–106)
CHOLEST SERPL-MCNC: 305 MG/DL (ref 100–199)
CO2 SERPL-SCNC: 27 MMOL/L (ref 20–29)
COLOR UR: YELLOW
CREAT SERPL-MCNC: 0.83 MG/DL (ref 0.57–1)
CREAT UR-MCNC: 231.4 MG/DL
EOSINOPHIL # BLD AUTO: 0.3 X10E3/UL (ref 0–0.4)
EOSINOPHIL NFR BLD AUTO: 3 %
EPI CELLS #/AREA URNS HPF: NORMAL /HPF
ERYTHROCYTE [DISTWIDTH] IN BLOOD BY AUTOMATED COUNT: 13.9 % (ref 12.3–15.4)
EST. AVERAGE GLUCOSE BLD GHB EST-MCNC: 206 MG/DL
GFR SERPLBLD CREATININE-BSD FMLA CKD-EPI: 74 ML/MIN/1.73
GFR SERPLBLD CREATININE-BSD FMLA CKD-EPI: 85 ML/MIN/1.73
GLOBULIN SER CALC-MCNC: 3.4 G/DL (ref 1.5–4.5)
GLUCOSE SERPL-MCNC: 224 MG/DL (ref 65–99)
GLUCOSE UR QL: ABNORMAL
HBA1C MFR BLD: 8.8 % (ref 4.8–5.6)
HCT VFR BLD AUTO: 42.4 % (ref 34–46.6)
HDLC SERPL-MCNC: 39 MG/DL
HGB BLD-MCNC: 14.7 G/DL (ref 11.1–15.9)
HGB UR QL STRIP: NEGATIVE
IMM GRANULOCYTES # BLD: 0 X10E3/UL (ref 0–0.1)
IMM GRANULOCYTES NFR BLD: 0 %
KETONES UR QL STRIP: ABNORMAL
LDLC SERPL CALC-MCNC: ABNORMAL MG/DL (ref 0–99)
LEUKOCYTE ESTERASE UR QL STRIP: ABNORMAL
LYMPHOCYTES # BLD AUTO: 1.9 X10E3/UL (ref 0.7–3.1)
LYMPHOCYTES NFR BLD AUTO: 20 %
MCH RBC QN AUTO: 30.8 PG (ref 26.6–33)
MCHC RBC AUTO-ENTMCNC: 34.7 G/DL (ref 31.5–35.7)
MCV RBC AUTO: 89 FL (ref 79–97)
MICRO URNS: ABNORMAL
MICROALBUMIN UR-MCNC: 30.8 UG/ML
MONOCYTES # BLD AUTO: 0.5 X10E3/UL (ref 0.1–0.9)
MONOCYTES NFR BLD AUTO: 5 %
MUCOUS THREADS URNS QL MICRO: PRESENT
NEUTROPHILS # BLD AUTO: 6.9 X10E3/UL (ref 1.4–7)
NEUTROPHILS NFR BLD AUTO: 71 %
NITRITE UR QL STRIP: NEGATIVE
PH UR STRIP: 5.5 [PH] (ref 5–7.5)
PLATELET # BLD AUTO: 272 X10E3/UL (ref 150–379)
POTASSIUM SERPL-SCNC: 4.2 MMOL/L (ref 3.5–5.2)
PROT SERPL-MCNC: 7.9 G/DL (ref 6–8.5)
PROT UR QL STRIP: ABNORMAL
RBC # BLD AUTO: 4.78 X10E6/UL (ref 3.77–5.28)
RBC #/AREA URNS HPF: NORMAL /HPF
SODIUM SERPL-SCNC: 139 MMOL/L (ref 134–144)
SP GR UR: 1.03 (ref 1–1.03)
TRIGL SERPL-MCNC: 422 MG/DL (ref 0–149)
TSH SERPL DL<=0.005 MIU/L-ACNC: 2.13 UIU/ML (ref 0.45–4.5)
UROBILINOGEN UR STRIP-MCNC: 0.2 MG/DL (ref 0.2–1)
VLDLC SERPL CALC-MCNC: ABNORMAL MG/DL (ref 5–40)
WBC # BLD AUTO: 9.7 X10E3/UL (ref 3.4–10.8)
WBC #/AREA URNS HPF: NORMAL /HPF

## 2018-06-20 ENCOUNTER — TELEPHONE (OUTPATIENT)
Dept: INTERNAL MEDICINE | Age: 68
End: 2018-06-20

## 2018-06-20 DIAGNOSIS — E78.2 HYPERLIPEMIA, MIXED: Primary | ICD-10-CM

## 2018-06-20 NOTE — TELEPHONE ENCOUNTER
Reviewed lab   - for dm, is now getting back on actos and will follow up with Dr Kaur Crocker  - for extreme hyperlipidemia she's statin intolerant, agrees to coronary artery calcium ct to help guide intensity of treatment    Lipid lowering therapy not prescibed for medical reasons.

## 2018-07-11 ENCOUNTER — TELEPHONE (OUTPATIENT)
Dept: INTERNAL MEDICINE CLINIC | Age: 68
End: 2018-07-11

## 2018-07-11 NOTE — TELEPHONE ENCOUNTER
Left message for patient to return call. Patient eligible for MWV. Speak with Vicky to schedule this please.

## 2018-07-12 DIAGNOSIS — F34.1 DYSTHYMIA: ICD-10-CM

## 2018-07-12 RX ORDER — ESCITALOPRAM OXALATE 10 MG/1
TABLET ORAL
Qty: 30 TAB | Refills: 0 | Status: SHIPPED | OUTPATIENT
Start: 2018-07-12 | End: 2018-08-19 | Stop reason: SDUPTHER

## 2018-07-29 RX ORDER — GLIPIZIDE 10 MG/1
TABLET, FILM COATED, EXTENDED RELEASE ORAL
Qty: 90 TAB | Refills: 0 | Status: SHIPPED | COMMUNITY
Start: 2018-07-29 | End: 2018-11-13 | Stop reason: SDUPTHER

## 2018-10-10 DIAGNOSIS — I10 ESSENTIAL HYPERTENSION, BENIGN: ICD-10-CM

## 2018-10-11 ENCOUNTER — TELEPHONE (OUTPATIENT)
Dept: INTERNAL MEDICINE CLINIC | Age: 68
End: 2018-10-11

## 2018-10-11 RX ORDER — AMLODIPINE AND BENAZEPRIL HYDROCHLORIDE 5; 20 MG/1; MG/1
CAPSULE ORAL
Qty: 90 CAP | Refills: 0 | Status: SHIPPED | OUTPATIENT
Start: 2018-10-11 | End: 2019-01-11 | Stop reason: SDUPTHER

## 2018-10-12 DIAGNOSIS — I10 ESSENTIAL HYPERTENSION, BENIGN: ICD-10-CM

## 2018-10-12 DIAGNOSIS — E78.2 MIXED HYPERLIPIDEMIA: ICD-10-CM

## 2018-11-06 ENCOUNTER — OFFICE VISIT (OUTPATIENT)
Dept: INTERNAL MEDICINE CLINIC | Age: 68
End: 2018-11-06

## 2018-11-06 VITALS
RESPIRATION RATE: 20 BRPM | HEIGHT: 65 IN | SYSTOLIC BLOOD PRESSURE: 122 MMHG | WEIGHT: 219.4 LBS | DIASTOLIC BLOOD PRESSURE: 78 MMHG | BODY MASS INDEX: 36.55 KG/M2 | TEMPERATURE: 98.7 F | OXYGEN SATURATION: 97 % | HEART RATE: 102 BPM

## 2018-11-06 DIAGNOSIS — C20 RECTAL CANCER (HCC): ICD-10-CM

## 2018-11-06 DIAGNOSIS — Z23 ENCOUNTER FOR IMMUNIZATION: ICD-10-CM

## 2018-11-06 DIAGNOSIS — I10 ESSENTIAL HYPERTENSION, BENIGN: Primary | ICD-10-CM

## 2018-11-06 DIAGNOSIS — E78.2 HYPERLIPEMIA, MIXED: ICD-10-CM

## 2018-11-06 DIAGNOSIS — R53.82 CHRONIC FATIGUE: ICD-10-CM

## 2018-11-06 NOTE — PROGRESS NOTES
HISTORY OF PRESENT ILLNESS  Lucio Gorman is a 80 y.o. female. HPI Kenyetta is seen today for follow up of hypertension, diabetes and other problems. 1. Hypertension. Fair readings initially, but improved on recheck. 2. Diabetes. Due for routine labs. 3. Hyperlipidemia. Intolerant of statin drugs. She has risk factors for coronary disease. She has not yet done her coronary artery CT scan to help guide intensity of treatment. She vows she will do this. Review of systems notable for ongoing chronic fatigue. Advised her to have some routine labs checked. She has had a normal thyroid function test within the last year. Past medical history notable for chronic fatigue symptoms. At one point, she did appear to have chronic EBD although she has no specific infection symptoms at this point. Review of Systems   Constitutional: Positive for malaise/fatigue. Negative for weight loss. Respiratory: Negative. Cardiovascular: Negative for chest pain, palpitations, leg swelling and PND. Musculoskeletal: Negative for myalgias. Neurological: Negative for focal weakness. Physical Exam   Constitutional: She appears well-nourished. Neck: Carotid bruit is not present. Cardiovascular: Normal rate and regular rhythm. Exam reveals no gallop and no friction rub. No murmur heard. Pulmonary/Chest: Effort normal and breath sounds normal. No respiratory distress. Musculoskeletal: She exhibits no edema. Nursing note and vitals reviewed. ASSESSMENT and PLAN  Diagnoses and all orders for this visit:    1. Essential hypertension, benign- Continue current regimen of prescription and / or OTC medications     2. Uncontrolled type 2 diabetes mellitus without complication, without long-term current use of insulin ([facility]- check labs as ordered    3. Hyperlipemia, mixed- Proceed with plan as discussed     4. Rectal cancer [facility])- See specialists  as directed.      5. Chronic fatigue- Follow off treatment , Proceed with plan as discussed     6.  Encounter for immunization  -     ADMIN INFLUENZA VIRUS VAC  -     INFLUENZA VACCINE INACTIVATED (IIV), SUBUNIT, ADJUVANTED, IM

## 2018-11-13 DIAGNOSIS — F34.1 DYSTHYMIA: ICD-10-CM

## 2018-11-13 RX ORDER — GLIPIZIDE 10 MG/1
TABLET, FILM COATED, EXTENDED RELEASE ORAL
Qty: 90 TAB | Refills: 0 | Status: SHIPPED | OUTPATIENT
Start: 2018-11-13 | End: 2019-03-25 | Stop reason: SDUPTHER

## 2018-11-13 RX ORDER — ALPRAZOLAM 0.5 MG/1
TABLET ORAL
Qty: 20 TAB | Refills: 0 | OUTPATIENT
Start: 2018-11-13 | End: 2018-12-11 | Stop reason: SDUPTHER

## 2018-11-13 NOTE — TELEPHONE ENCOUNTER
Phoned in prescription below to patients pharmacy on file - verbal order received : Dr Yong Aguirre.

## 2018-12-11 DIAGNOSIS — F34.1 DYSTHYMIA: ICD-10-CM

## 2018-12-12 RX ORDER — ALPRAZOLAM 0.5 MG/1
TABLET ORAL
Qty: 20 TAB | Refills: 0 | OUTPATIENT
Start: 2018-12-12 | End: 2019-02-13 | Stop reason: SDUPTHER

## 2018-12-12 NOTE — TELEPHONE ENCOUNTER
Phoned in prescription below to patients pharmacy on file - verbal order received : Dr Princess Arrieta.

## 2019-01-11 DIAGNOSIS — G43.709 CHRONIC MIGRAINE WITHOUT AURA WITHOUT STATUS MIGRAINOSUS, NOT INTRACTABLE: ICD-10-CM

## 2019-01-11 DIAGNOSIS — I10 ESSENTIAL HYPERTENSION, BENIGN: ICD-10-CM

## 2019-01-11 RX ORDER — AMLODIPINE AND BENAZEPRIL HYDROCHLORIDE 5; 20 MG/1; MG/1
CAPSULE ORAL
Qty: 90 CAP | Refills: 1 | Status: SHIPPED | OUTPATIENT
Start: 2019-01-11 | End: 2019-07-16 | Stop reason: SDUPTHER

## 2019-01-11 RX ORDER — PIOGLITAZONEHYDROCHLORIDE 15 MG/1
TABLET ORAL
Qty: 90 TAB | Refills: 0 | Status: SHIPPED | OUTPATIENT
Start: 2019-01-11 | End: 2019-08-06 | Stop reason: SDUPTHER

## 2019-01-11 RX ORDER — AMITRIPTYLINE HYDROCHLORIDE 100 MG/1
TABLET, FILM COATED ORAL
Qty: 90 TAB | Refills: 1 | Status: SHIPPED | OUTPATIENT
Start: 2019-01-11 | End: 2019-02-19 | Stop reason: SDUPTHER

## 2019-02-13 DIAGNOSIS — F34.1 DYSTHYMIA: ICD-10-CM

## 2019-02-14 RX ORDER — ALPRAZOLAM 0.5 MG/1
TABLET ORAL
Qty: 20 TAB | Refills: 0 | OUTPATIENT
Start: 2019-02-14 | End: 2019-05-09 | Stop reason: SDUPTHER

## 2019-02-15 ENCOUNTER — TELEPHONE (OUTPATIENT)
Dept: INTERNAL MEDICINE CLINIC | Age: 69
End: 2019-02-15

## 2019-02-15 NOTE — TELEPHONE ENCOUNTER
Spoke with pt - states she has been on Amitriptyline for many years. The 100 mg is \"useless in helping her sleep. \" She is overly tired and past 2 nights she took 2 tabs which helped her sleep well. Asked her if she felt drowsy or tired next day? She said she felt rested.  Will forward to MD.

## 2019-02-15 NOTE — TELEPHONE ENCOUNTER
Phoned in prescription below to patients pharmacy on file - verbal order received : Dr Cirilo Edwards.

## 2019-02-15 NOTE — TELEPHONE ENCOUNTER
Advised pt MD is ok to change Rx to equivalent of 200 mg qhs. But before starting this however she should come in for an ecg to make sure there is no rhythm disturbance. This may be a nurse visit. She will come in next week to have done. Once this is done will send in Rx.

## 2019-02-19 ENCOUNTER — CLINICAL SUPPORT (OUTPATIENT)
Dept: INTERNAL MEDICINE CLINIC | Age: 69
End: 2019-02-19

## 2019-02-19 VITALS
HEIGHT: 65 IN | RESPIRATION RATE: 18 BRPM | HEART RATE: 98 BPM | TEMPERATURE: 98.5 F | WEIGHT: 217 LBS | BODY MASS INDEX: 36.15 KG/M2 | OXYGEN SATURATION: 95 %

## 2019-02-19 DIAGNOSIS — G43.709 CHRONIC MIGRAINE WITHOUT AURA WITHOUT STATUS MIGRAINOSUS, NOT INTRACTABLE: ICD-10-CM

## 2019-02-19 DIAGNOSIS — Z79.899 MEDICATION THERAPY CHANGED: Primary | ICD-10-CM

## 2019-02-19 RX ORDER — AMITRIPTYLINE HYDROCHLORIDE 100 MG/1
TABLET, FILM COATED ORAL
Qty: 180 TAB | Refills: 1 | Status: SHIPPED | OUTPATIENT
Start: 2019-02-19 | End: 2019-02-25 | Stop reason: SDUPTHER

## 2019-02-20 LAB
ALBUMIN SERPL-MCNC: 4.4 G/DL (ref 3.6–4.8)
ALP SERPL-CCNC: 67 IU/L (ref 39–117)
ALT SERPL-CCNC: 41 IU/L (ref 0–32)
AST SERPL-CCNC: 23 IU/L (ref 0–40)
BILIRUB DIRECT SERPL-MCNC: 0.13 MG/DL (ref 0–0.4)
BILIRUB SERPL-MCNC: 0.4 MG/DL (ref 0–1.2)
BUN SERPL-MCNC: 18 MG/DL (ref 8–27)
BUN/CREAT SERPL: 22 (ref 12–28)
CALCIUM SERPL-MCNC: 9.4 MG/DL (ref 8.7–10.3)
CHLORIDE SERPL-SCNC: 100 MMOL/L (ref 96–106)
CHOLEST SERPL-MCNC: 274 MG/DL (ref 100–199)
CO2 SERPL-SCNC: 21 MMOL/L (ref 20–29)
CREAT SERPL-MCNC: 0.82 MG/DL (ref 0.57–1)
EST. AVERAGE GLUCOSE BLD GHB EST-MCNC: 157 MG/DL
GLUCOSE SERPL-MCNC: 249 MG/DL (ref 65–99)
HBA1C MFR BLD: 7.1 % (ref 4.8–5.6)
HDLC SERPL-MCNC: 39 MG/DL
LDLC SERPL CALC-MCNC: 165 MG/DL (ref 0–99)
POTASSIUM SERPL-SCNC: 4.8 MMOL/L (ref 3.5–5.2)
PROT SERPL-MCNC: 7.6 G/DL (ref 6–8.5)
SODIUM SERPL-SCNC: 137 MMOL/L (ref 134–144)
TRIGL SERPL-MCNC: 348 MG/DL (ref 0–149)
VLDLC SERPL CALC-MCNC: 70 MG/DL (ref 5–40)

## 2019-02-23 NOTE — PROGRESS NOTES
Call-  1- high fasting sugar but A1c is good at 7.1. Work on Diet and exercise and Continue current regimen of prescription and / or OTC medications   2- cholesterol is extremely high putting her at risk for heart attack and stroke. I strongly encourage the ct scan of the coronary arteries to decide if we need to pursue higher level cholesterol treatments.  I ordered this last Sumi

## 2019-02-25 DIAGNOSIS — G43.709 CHRONIC MIGRAINE WITHOUT AURA WITHOUT STATUS MIGRAINOSUS, NOT INTRACTABLE: ICD-10-CM

## 2019-02-25 RX ORDER — AMITRIPTYLINE HYDROCHLORIDE 100 MG/1
TABLET, FILM COATED ORAL
Qty: 180 TAB | Refills: 1 | Status: SHIPPED | OUTPATIENT
Start: 2019-02-25 | End: 2019-03-01 | Stop reason: SDUPTHER

## 2019-02-25 NOTE — TELEPHONE ENCOUNTER
Per pt's request - called [facility] - spoke to Val Dumont. Advised her pt requesting Amitriptyline to be sent to different pharmacy. Please cancel order there. Resent to [facility] as pt requested.

## 2019-02-25 NOTE — PROGRESS NOTES
Advised pt:  1- high fasting sugar but A1c is good at 7.1. Work on diet and exercise and continue current regimen of prescription and / or OTC medications. 2- cholesterol is extremely high putting her at risk for heart attack and stroke. MD strongly encourage the ct scan of the coronary arteries to decide if we need to pursue higher level cholesterol treatments. MD ordered this last June. Given her number to schedule ([phone]). Pt states [facility] Advised her it went to [facility]. Only pharmacy in her chart. She has requested to send to [facility]

## 2019-03-01 DIAGNOSIS — G43.709 CHRONIC MIGRAINE WITHOUT AURA WITHOUT STATUS MIGRAINOSUS, NOT INTRACTABLE: ICD-10-CM

## 2019-03-02 RX ORDER — AMITRIPTYLINE HYDROCHLORIDE 100 MG/1
TABLET, FILM COATED ORAL
Qty: 180 TAB | Refills: 1 | Status: SHIPPED | OUTPATIENT
Start: 2019-03-02 | End: 2019-08-06 | Stop reason: SDUPTHER

## 2019-03-13 DIAGNOSIS — G43.709 CHRONIC MIGRAINE WITHOUT AURA WITHOUT STATUS MIGRAINOSUS, NOT INTRACTABLE: ICD-10-CM

## 2019-03-13 DIAGNOSIS — F34.1 DYSTHYMIA: ICD-10-CM

## 2019-03-14 RX ORDER — ESCITALOPRAM OXALATE 10 MG/1
TABLET ORAL
Qty: 30 TAB | Refills: 4 | Status: SHIPPED | OUTPATIENT
Start: 2019-03-14 | End: 2019-09-06 | Stop reason: SDUPTHER

## 2019-03-14 RX ORDER — BUTALBITAL, ACETAMINOPHEN AND CAFFEINE 50; 325; 40 MG/1; MG/1; MG/1
TABLET ORAL
Qty: 30 TAB | Refills: 2 | Status: SHIPPED | OUTPATIENT
Start: 2019-03-14 | End: 2020-07-16

## 2019-03-21 DIAGNOSIS — G43.709 CHRONIC MIGRAINE WITHOUT AURA WITHOUT STATUS MIGRAINOSUS, NOT INTRACTABLE: ICD-10-CM

## 2019-03-21 RX ORDER — AMITRIPTYLINE HYDROCHLORIDE 50 MG/1
200 TABLET, FILM COATED ORAL
Qty: 360 TAB | Refills: 1 | Status: SHIPPED | OUTPATIENT
Start: 2019-03-21 | End: 2019-09-12 | Stop reason: SDUPTHER

## 2019-03-26 RX ORDER — GLIPIZIDE 10 MG/1
TABLET, FILM COATED, EXTENDED RELEASE ORAL
Qty: 90 TAB | Refills: 0 | Status: SHIPPED | OUTPATIENT
Start: 2019-03-26 | End: 2019-07-23 | Stop reason: SDUPTHER

## 2019-05-09 DIAGNOSIS — F34.1 DYSTHYMIA: ICD-10-CM

## 2019-05-10 RX ORDER — ALPRAZOLAM 0.5 MG/1
TABLET ORAL
Qty: 20 TAB | Refills: 0 | OUTPATIENT
Start: 2019-05-10 | End: 2019-07-23 | Stop reason: SDUPTHER

## 2019-06-12 DIAGNOSIS — E78.2 MIXED HYPERLIPIDEMIA: ICD-10-CM

## 2019-06-13 RX ORDER — FENOFIBRATE 200 MG/1
CAPSULE ORAL
Qty: 30 CAP | Refills: 0 | Status: SHIPPED | OUTPATIENT
Start: 2019-06-13 | End: 2019-09-18 | Stop reason: SDUPTHER

## 2019-07-12 DIAGNOSIS — I10 ESSENTIAL HYPERTENSION, BENIGN: ICD-10-CM

## 2019-07-12 RX ORDER — AMLODIPINE AND BENAZEPRIL HYDROCHLORIDE 5; 20 MG/1; MG/1
CAPSULE ORAL
Qty: 90 CAP | Refills: 0 | OUTPATIENT
Start: 2019-07-12

## 2019-07-16 DIAGNOSIS — I10 ESSENTIAL HYPERTENSION, BENIGN: ICD-10-CM

## 2019-07-16 RX ORDER — AMLODIPINE AND BENAZEPRIL HYDROCHLORIDE 5; 20 MG/1; MG/1
CAPSULE ORAL
Qty: 30 CAP | Refills: 0 | Status: SHIPPED | OUTPATIENT
Start: 2019-07-16 | End: 2019-08-29 | Stop reason: SDUPTHER

## 2019-07-23 DIAGNOSIS — I10 ESSENTIAL HYPERTENSION, BENIGN: ICD-10-CM

## 2019-07-23 DIAGNOSIS — F34.1 DYSTHYMIA: ICD-10-CM

## 2019-07-23 RX ORDER — GLIPIZIDE 10 MG/1
TABLET, FILM COATED, EXTENDED RELEASE ORAL
Qty: 90 TAB | Refills: 0 | Status: SHIPPED | OUTPATIENT
Start: 2019-07-23 | End: 2019-11-05 | Stop reason: SDUPTHER

## 2019-07-23 RX ORDER — ALPRAZOLAM 0.5 MG/1
TABLET ORAL
Qty: 20 TAB | Refills: 0 | OUTPATIENT
Start: 2019-07-23 | End: 2019-10-10 | Stop reason: SDUPTHER

## 2019-07-23 NOTE — TELEPHONE ENCOUNTER
Phoned in:  ALPRAZolam (XANAX) 0.5 mg tablet          Sig: TAKE ONE TABLET BY MOUTH THREE TIMES A DAY AS NEEDED FOR ANXIETY    Disp:  20 Tab    Refills:  0

## 2019-07-23 NOTE — TELEPHONE ENCOUNTER
Jus Ivy (Self) [phone] (H)     Pt says that her rx for amlodipine was sent to the wrong pharm and she wants it sent to [facility].

## 2019-07-24 NOTE — TELEPHONE ENCOUNTER
Called pt's [facility] in regards to her amlodipine sent to them. Was going to cancel and send to [facility] but was advised pt's  had already picked up. Did not send to [facility]. Pt has appt coming up.

## 2019-08-06 ENCOUNTER — OFFICE VISIT (OUTPATIENT)
Dept: INTERNAL MEDICINE CLINIC | Age: 69
End: 2019-08-06

## 2019-08-06 VITALS
TEMPERATURE: 98.8 F | SYSTOLIC BLOOD PRESSURE: 134 MMHG | WEIGHT: 210.8 LBS | DIASTOLIC BLOOD PRESSURE: 80 MMHG | HEIGHT: 65 IN | HEART RATE: 94 BPM | BODY MASS INDEX: 35.12 KG/M2 | RESPIRATION RATE: 18 BRPM | OXYGEN SATURATION: 98 %

## 2019-08-06 DIAGNOSIS — E78.2 MIXED HYPERLIPIDEMIA: ICD-10-CM

## 2019-08-06 DIAGNOSIS — E78.2 HYPERLIPEMIA, MIXED: ICD-10-CM

## 2019-08-06 DIAGNOSIS — I10 ESSENTIAL HYPERTENSION, BENIGN: ICD-10-CM

## 2019-08-06 DIAGNOSIS — E11.9 CONTROLLED TYPE 2 DIABETES MELLITUS WITHOUT COMPLICATION, WITHOUT LONG-TERM CURRENT USE OF INSULIN (HCC): Primary | ICD-10-CM

## 2019-08-06 DIAGNOSIS — F34.1 DYSTHYMIA: ICD-10-CM

## 2019-08-06 DIAGNOSIS — C20 RECTAL CANCER (HCC): ICD-10-CM

## 2019-08-06 DIAGNOSIS — G43.709 CHRONIC MIGRAINE WITHOUT AURA WITHOUT STATUS MIGRAINOSUS, NOT INTRACTABLE: ICD-10-CM

## 2019-08-06 RX ORDER — PIOGLITAZONEHYDROCHLORIDE 15 MG/1
15 TABLET ORAL DAILY
Qty: 90 TAB | Refills: 1 | Status: SHIPPED | OUTPATIENT
Start: 2019-08-06 | End: 2020-05-21

## 2019-08-06 NOTE — PROGRESS NOTES
HISTORY OF PRESENT ILLNESS  Fracisco Saucedo is a 80 y.o. female. HPI Subjective:  Kenyetta was seen today for follow up of hypertension, diabetes and other problems. 1. Hypertension, stable on current regimen. 2. Diabetes. She is overdue for routine labs. She has decreased her sugar intake, but has not really cut back on starchy carbohydrates. Encouraged this for management of her diabetes. 3. Hyperlipidemia. She has been intolerant of treatment. She did not get the coronary artery CT scan to help guide intensity of treatment. 4. Rectal cancer, up to date with specialist follow up. Current Outpatient Medications   Medication Sig    pioglitazone (ACTOS) 15 mg tablet Take 1 Tab by mouth daily.  glipiZIDE SR (GLUCOTROL XL) 10 mg CR tablet TAKE ONE TABLET BY MOUTH DAILY    ALPRAZolam (XANAX) 0.5 mg tablet TAKE ONE TABLET BY MOUTH THREE TIMES A DAY AS NEEDED FOR ANXIETY    amLODIPine-benazepril (LOTREL) 5-20 mg per capsule TAKE ONE CAPSULE BY MOUTH DAILY    fenofibrate micronized (LOFIBRA) 200 mg capsule TAKE ONE CAPSULE BY MOUTH DAILY    amitriptyline (ELAVIL) 50 mg tablet Take 4 Tabs by mouth nightly.  butalbital-acetaminophen-caffeine (FIORICET, ESGIC) -40 mg per tablet TAKE ONE TABLET BY MOUTH EVERY 4 HOURS AS NEEDED FOR HEADACHE    escitalopram oxalate (LEXAPRO) 10 mg tablet TAKE ONE TABLET BY MOUTH DAILY    triamcinolone acetonide (NASACORT AQ NA) by Nasal route daily.  mometasone (ELOCON) 0.1 % topical cream Apply  to affected area daily. (Patient taking differently: Apply  to affected area daily as needed.)    dicyclomine (BENTYL) 10 mg capsule Take 10 mg by mouth daily.  omega-3 fatty acids-vitamin e (FISH OIL) 1,000 mg Cap Take 1,000 mg by mouth four (4) times daily.  aspirin delayed-release 81 mg tablet Take 81 mg by mouth. 2 po qd      No current facility-administered medications for this visit. Lipid lowering therapy not prescibed for patient reasons.       Review of Systems   Constitutional: Negative for weight loss. Respiratory: Negative. Cardiovascular: Negative for chest pain, palpitations, leg swelling and PND. Musculoskeletal: Negative for myalgias. Neurological: Negative for focal weakness. Physical Exam   Constitutional: She appears well-nourished. Neck: Carotid bruit is not present. Cardiovascular: Normal rate and regular rhythm. Exam reveals no gallop and no friction rub. No murmur heard. Pulmonary/Chest: Effort normal and breath sounds normal. No respiratory distress. Musculoskeletal: She exhibits no edema. Nursing note and vitals reviewed. ASSESSMENT and PLAN  Diagnoses and all orders for this visit:    1. Controlled type 2 diabetes mellitus without complication, without long-term current use of insulin (HCC)  -     CBC WITH AUTOMATED DIFF  -     HEMOGLOBIN A1C WITH EAG  -     METABOLIC PANEL, COMPREHENSIVE  -     pioglitazone (ACTOS) 15 mg tablet; Take 1 Tab by mouth daily. 2. Essential hypertension, benign- Continue current regimen of prescription and / or OTC medications     3. Dysthymia- Continue current regimen of prescription and / or OTC medications     4. Mixed hyperlipidemia    5. Chronic migraine without aura without status migrainosus, not intractable- Continue current regimen of prescription and / or OTC medications     6. Hyperlipemia, mixed- Diet and exercise , Follow off treatment     7.  Rectal cancer [facility])- See gastroenterologist as directed

## 2019-08-27 DIAGNOSIS — E78.2 MIXED HYPERLIPIDEMIA: ICD-10-CM

## 2019-08-27 DIAGNOSIS — I10 ESSENTIAL HYPERTENSION, BENIGN: ICD-10-CM

## 2019-08-27 RX ORDER — FENOFIBRATE 200 MG/1
CAPSULE ORAL
Qty: 30 CAP | Refills: 0 | OUTPATIENT
Start: 2019-08-27

## 2019-08-27 RX ORDER — AMLODIPINE AND BENAZEPRIL HYDROCHLORIDE 5; 20 MG/1; MG/1
CAPSULE ORAL
Qty: 90 CAP | Refills: 0 | OUTPATIENT
Start: 2019-08-27

## 2019-08-29 ENCOUNTER — TELEPHONE (OUTPATIENT)
Dept: INTERNAL MEDICINE CLINIC | Age: 69
End: 2019-08-29

## 2019-08-29 DIAGNOSIS — I10 ESSENTIAL HYPERTENSION, BENIGN: ICD-10-CM

## 2019-08-29 RX ORDER — AMLODIPINE AND BENAZEPRIL HYDROCHLORIDE 5; 20 MG/1; MG/1
CAPSULE ORAL
Qty: 15 CAP | Refills: 0 | Status: SHIPPED | OUTPATIENT
Start: 2019-08-29 | End: 2019-09-18 | Stop reason: SDUPTHER

## 2019-08-29 NOTE — TELEPHONE ENCOUNTER
Pt called to say she will be in on Tuesday to have labs done. She is out of her BP med and wants to know if MD would at least send in that one. Advised her he may send in enough to cover her until she gets labs done. She states she's been on med for years.  Will forward to MD.

## 2019-08-29 NOTE — TELEPHONE ENCOUNTER
Left detailed message on pt's personal vm - MD can't rf meds until labs are done. Please call back if she has any questions.

## 2019-09-04 LAB
ALBUMIN SERPL-MCNC: 4.1 G/DL (ref 3.6–4.8)
ALBUMIN/GLOB SERPL: 1.3 {RATIO} (ref 1.2–2.2)
ALP SERPL-CCNC: 69 IU/L (ref 39–117)
ALT SERPL-CCNC: 52 IU/L (ref 0–32)
AST SERPL-CCNC: 27 IU/L (ref 0–40)
BASOPHILS # BLD AUTO: 0.1 X10E3/UL (ref 0–0.2)
BASOPHILS NFR BLD AUTO: 1 %
BILIRUB SERPL-MCNC: 0.5 MG/DL (ref 0–1.2)
BUN SERPL-MCNC: 20 MG/DL (ref 8–27)
BUN/CREAT SERPL: 30 (ref 12–28)
CALCIUM SERPL-MCNC: 9.2 MG/DL (ref 8.7–10.3)
CHLORIDE SERPL-SCNC: 100 MMOL/L (ref 96–106)
CO2 SERPL-SCNC: 24 MMOL/L (ref 20–29)
CREAT SERPL-MCNC: 0.67 MG/DL (ref 0.57–1)
EOSINOPHIL # BLD AUTO: 0.4 X10E3/UL (ref 0–0.4)
EOSINOPHIL NFR BLD AUTO: 4 %
ERYTHROCYTE [DISTWIDTH] IN BLOOD BY AUTOMATED COUNT: 13.9 % (ref 12.3–15.4)
EST. AVERAGE GLUCOSE BLD GHB EST-MCNC: 151 MG/DL
GLOBULIN SER CALC-MCNC: 3.1 G/DL (ref 1.5–4.5)
GLUCOSE SERPL-MCNC: 111 MG/DL (ref 65–99)
HBA1C MFR BLD: 6.9 % (ref 4.8–5.6)
HCT VFR BLD AUTO: 42.1 % (ref 34–46.6)
HGB BLD-MCNC: 13.5 G/DL (ref 11.1–15.9)
IMM GRANULOCYTES # BLD AUTO: 0 X10E3/UL (ref 0–0.1)
IMM GRANULOCYTES NFR BLD AUTO: 1 %
LYMPHOCYTES # BLD AUTO: 2 X10E3/UL (ref 0.7–3.1)
LYMPHOCYTES NFR BLD AUTO: 22 %
MCH RBC QN AUTO: 29.8 PG (ref 26.6–33)
MCHC RBC AUTO-ENTMCNC: 32.1 G/DL (ref 31.5–35.7)
MCV RBC AUTO: 93 FL (ref 79–97)
MONOCYTES # BLD AUTO: 0.4 X10E3/UL (ref 0.1–0.9)
MONOCYTES NFR BLD AUTO: 5 %
NEUTROPHILS # BLD AUTO: 5.9 X10E3/UL (ref 1.4–7)
NEUTROPHILS NFR BLD AUTO: 67 %
PLATELET # BLD AUTO: 278 X10E3/UL (ref 150–450)
POTASSIUM SERPL-SCNC: 4.7 MMOL/L (ref 3.5–5.2)
PROT SERPL-MCNC: 7.2 G/DL (ref 6–8.5)
RBC # BLD AUTO: 4.53 X10E6/UL (ref 3.77–5.28)
SODIUM SERPL-SCNC: 139 MMOL/L (ref 134–144)
WBC # BLD AUTO: 8.7 X10E3/UL (ref 3.4–10.8)

## 2019-09-04 NOTE — PROGRESS NOTES
Angela -   1- call- Labs look great overall , Continue current regimen of prescription and / or OTC medications   2- have her translate her handwritten note ! Respiratory

## 2019-09-06 DIAGNOSIS — F34.1 DYSTHYMIA: ICD-10-CM

## 2019-09-07 RX ORDER — ESCITALOPRAM OXALATE 10 MG/1
TABLET ORAL
Qty: 30 TAB | Refills: 3 | Status: SHIPPED | OUTPATIENT
Start: 2019-09-07 | End: 2020-01-22 | Stop reason: SDUPTHER

## 2019-09-09 NOTE — PROGRESS NOTES
Pt. Notified of labs and states she did not miss any actos, is working on her diet and they are going to water exercise 2xwk at [facility]. And said no one can read her writing!

## 2019-09-12 RX ORDER — AMITRIPTYLINE HYDROCHLORIDE 50 MG/1
TABLET, FILM COATED ORAL
Qty: 360 TAB | Refills: 1 | Status: SHIPPED | OUTPATIENT
Start: 2019-09-12 | End: 2020-04-07

## 2019-10-10 ENCOUNTER — TELEPHONE (OUTPATIENT)
Dept: INTERNAL MEDICINE CLINIC | Age: 69
End: 2019-10-10

## 2019-10-10 DIAGNOSIS — F34.1 DYSTHYMIA: ICD-10-CM

## 2019-10-10 RX ORDER — ALPRAZOLAM 0.5 MG/1
TABLET ORAL
Qty: 20 TAB | Refills: 0 | OUTPATIENT
Start: 2019-10-10 | End: 2019-12-24

## 2019-10-10 NOTE — TELEPHONE ENCOUNTER
Phoned in prescription below to patients pharmacy on file - verbal order received : Dr Ronalee Skiff.

## 2019-11-05 RX ORDER — GLIPIZIDE 10 MG/1
TABLET, FILM COATED, EXTENDED RELEASE ORAL
Qty: 90 TAB | Refills: 0 | Status: SHIPPED | OUTPATIENT
Start: 2019-11-05 | End: 2020-03-04

## 2019-12-22 DIAGNOSIS — F34.1 DYSTHYMIA: ICD-10-CM

## 2019-12-24 RX ORDER — ALPRAZOLAM 0.5 MG/1
TABLET ORAL
Qty: 20 TAB | Refills: 0 | Status: SHIPPED | OUTPATIENT
Start: 2019-12-24 | End: 2020-02-27 | Stop reason: SDUPTHER

## 2020-01-15 ENCOUNTER — ANESTHESIA (OUTPATIENT)
Dept: ENDOSCOPY | Age: 70
End: 2020-01-15
Payer: MEDICARE

## 2020-01-15 ENCOUNTER — ANESTHESIA EVENT (OUTPATIENT)
Dept: ENDOSCOPY | Age: 70
End: 2020-01-15
Payer: MEDICARE

## 2020-01-15 ENCOUNTER — HOSPITAL ENCOUNTER (OUTPATIENT)
Age: 70
Setting detail: OUTPATIENT SURGERY
Discharge: HOME | End: 2020-01-15
Attending: INTERNAL MEDICINE | Admitting: INTERNAL MEDICINE
Payer: MEDICARE

## 2020-01-15 VITALS
WEIGHT: 210.75 LBS | OXYGEN SATURATION: 97 % | DIASTOLIC BLOOD PRESSURE: 72 MMHG | HEART RATE: 84 BPM | TEMPERATURE: 98.2 F | RESPIRATION RATE: 12 BRPM | BODY MASS INDEX: 35.11 KG/M2 | HEIGHT: 65 IN | SYSTOLIC BLOOD PRESSURE: 132 MMHG

## 2020-01-15 PROCEDURE — 76040000007: Performed by: INTERNAL MEDICINE

## 2020-01-15 PROCEDURE — 74011000250 HC RX REV CODE- 250: Performed by: NURSE ANESTHETIST, CERTIFIED REGISTERED

## 2020-01-15 PROCEDURE — 74011250636 HC RX REV CODE- 250/636: Performed by: NURSE ANESTHETIST, CERTIFIED REGISTERED

## 2020-01-15 PROCEDURE — 76060000032 HC ANESTHESIA 0.5 TO 1 HR: Performed by: INTERNAL MEDICINE

## 2020-01-15 RX ORDER — SODIUM CHLORIDE 9 MG/ML
50 INJECTION, SOLUTION INTRAVENOUS CONTINUOUS
Status: DISCONTINUED | OUTPATIENT
Start: 2020-01-15 | End: 2020-01-15 | Stop reason: HOSPADM

## 2020-01-15 RX ORDER — ATROPINE SULFATE 0.1 MG/ML
0.5 INJECTION INTRAVENOUS
Status: DISCONTINUED | OUTPATIENT
Start: 2020-01-15 | End: 2020-01-15 | Stop reason: HOSPADM

## 2020-01-15 RX ORDER — SODIUM CHLORIDE 9 MG/ML
INJECTION, SOLUTION INTRAVENOUS
Status: DISCONTINUED | OUTPATIENT
Start: 2020-01-15 | End: 2020-01-15 | Stop reason: HOSPADM

## 2020-01-15 RX ORDER — EPINEPHRINE 0.1 MG/ML
1 INJECTION INTRACARDIAC; INTRAVENOUS
Status: DISCONTINUED | OUTPATIENT
Start: 2020-01-15 | End: 2020-01-15 | Stop reason: HOSPADM

## 2020-01-15 RX ORDER — PROPOFOL 10 MG/ML
INJECTION, EMULSION INTRAVENOUS AS NEEDED
Status: DISCONTINUED | OUTPATIENT
Start: 2020-01-15 | End: 2020-01-15 | Stop reason: HOSPADM

## 2020-01-15 RX ORDER — FLUMAZENIL 0.1 MG/ML
0.2 INJECTION INTRAVENOUS
Status: DISCONTINUED | OUTPATIENT
Start: 2020-01-15 | End: 2020-01-15 | Stop reason: HOSPADM

## 2020-01-15 RX ORDER — LIDOCAINE HYDROCHLORIDE 20 MG/ML
INJECTION, SOLUTION EPIDURAL; INFILTRATION; INTRACAUDAL; PERINEURAL AS NEEDED
Status: DISCONTINUED | OUTPATIENT
Start: 2020-01-15 | End: 2020-01-15 | Stop reason: HOSPADM

## 2020-01-15 RX ORDER — SODIUM CHLORIDE 0.9 % (FLUSH) 0.9 %
5-40 SYRINGE (ML) INJECTION EVERY 8 HOURS
Status: DISCONTINUED | OUTPATIENT
Start: 2020-01-15 | End: 2020-01-15 | Stop reason: HOSPADM

## 2020-01-15 RX ORDER — FENTANYL CITRATE 50 UG/ML
25-200 INJECTION, SOLUTION INTRAMUSCULAR; INTRAVENOUS
Status: DISCONTINUED | OUTPATIENT
Start: 2020-01-15 | End: 2020-01-15 | Stop reason: HOSPADM

## 2020-01-15 RX ORDER — SODIUM CHLORIDE 0.9 % (FLUSH) 0.9 %
5-40 SYRINGE (ML) INJECTION AS NEEDED
Status: DISCONTINUED | OUTPATIENT
Start: 2020-01-15 | End: 2020-01-15 | Stop reason: HOSPADM

## 2020-01-15 RX ORDER — NALOXONE HYDROCHLORIDE 0.4 MG/ML
0.4 INJECTION, SOLUTION INTRAMUSCULAR; INTRAVENOUS; SUBCUTANEOUS
Status: DISCONTINUED | OUTPATIENT
Start: 2020-01-15 | End: 2020-01-15 | Stop reason: HOSPADM

## 2020-01-15 RX ORDER — MIDAZOLAM HYDROCHLORIDE 1 MG/ML
.25-5 INJECTION, SOLUTION INTRAMUSCULAR; INTRAVENOUS
Status: DISCONTINUED | OUTPATIENT
Start: 2020-01-15 | End: 2020-01-15 | Stop reason: HOSPADM

## 2020-01-15 RX ORDER — DEXTROMETHORPHAN/PSEUDOEPHED 2.5-7.5/.8
1.2 DROPS ORAL
Status: DISCONTINUED | OUTPATIENT
Start: 2020-01-15 | End: 2020-01-15 | Stop reason: HOSPADM

## 2020-01-15 RX ADMIN — LIDOCAINE HYDROCHLORIDE 60 MG: 20 INJECTION, SOLUTION EPIDURAL; INFILTRATION; INTRACAUDAL; PERINEURAL at 14:10

## 2020-01-15 RX ADMIN — PROPOFOL 40 MG: 10 INJECTION, EMULSION INTRAVENOUS at 14:12

## 2020-01-15 RX ADMIN — PROPOFOL 30 MG: 10 INJECTION, EMULSION INTRAVENOUS at 14:14

## 2020-01-15 RX ADMIN — PROPOFOL 30 MG: 10 INJECTION, EMULSION INTRAVENOUS at 14:13

## 2020-01-15 RX ADMIN — PROPOFOL 50 MG: 10 INJECTION, EMULSION INTRAVENOUS at 14:11

## 2020-01-15 RX ADMIN — PROPOFOL 30 MG: 10 INJECTION, EMULSION INTRAVENOUS at 14:20

## 2020-01-15 RX ADMIN — PROPOFOL 30 MG: 10 INJECTION, EMULSION INTRAVENOUS at 14:17

## 2020-01-15 RX ADMIN — SODIUM CHLORIDE: 900 INJECTION, SOLUTION INTRAVENOUS at 14:01

## 2020-01-15 RX ADMIN — PROPOFOL 30 MG: 10 INJECTION, EMULSION INTRAVENOUS at 14:23

## 2020-01-15 NOTE — DISCHARGE INSTRUCTIONS
[address], [address] [facility]    COLON DISCHARGE INSTRUCTIONS    Brianna Hutchinson  [id number]  1950    Discomfort:  Redness at IV site- apply warm compress to area; if redness or soreness persist- contact your physician  There may be a slight amount of blood passed from the rectum  Gaseous discomfort- walking, belching will help relieve any discomfort  You may not operate a vehicle for 12 hours  You may not engage in an occupation involving machinery or appliances for rest of today  You may not drink alcoholic beverages for at least 12 hours  Avoid making any critical decisions for at least 24 hour  DIET:  You may resume your regular diet - however -  remember your colon is empty and a heavy meal will produce gas. Avoid these foods:  vegetables, fried / greasy foods, carbonated drinks     ACTIVITY:  You may  resume your normal daily activities it is recommended that you spend the remainder of the day resting -  avoid any strenuous activity. CALL M.D.   ANY SIGN OF:   Increasing pain, nausea, vomiting  Abdominal distension (swelling)  New increased bleeding (oral or rectal)  Fever (chills)  Pain in chest area  Bloody discharge from nose or mouth  Shortness of breath      Follow-up Instructions:   Call Dr. Etelvina Brasher for any questions or problems at [phone]   Repeat colonoscopy in 3 years          ENDOSCOPY FINDINGS:   Your colonoscopy was normal.  Telephone # [phone]      Signed By: Etelvina Brasher MD     1/15/2020  2:30 PM       DISCHARGE SUMMARY from Nurse    The following personal items collected during your admission are returned to you:   Dental Appliance: Dental Appliances: None  Vision: Visual Aid: None  Hearing Aid:    Jewelry:    Clothing:    Other Valuables:    Valuables sent to safe:

## 2020-01-15 NOTE — PROCEDURES
[address]  [address] [facility], [address] [place] [address]      Colonoscopy Operative Report    Kendra Glez  [id number]  1950      Procedure Type:   Colonoscopy --diagnostic     Indications:    Personal history of colon cancer (screening only)   Date of last colonoscopy: 3 years ago, Polyps  Yes    Pre-operative Diagnosis: see indication above    Post-operative Diagnosis:  See findings below    :  Jaycob Alejandro MD    Surgical Assistant: None    Implants:  None    Referring Provider: Montse Rosenberg MD      Sedation:  MAC anesthesia Propofol      Procedure Details:  After informed consent was obtained with all risks and benefits of procedure explained and preoperative exam completed, the patient was taken to the endoscopy suite and placed in the left lateral decubitus position. Upon sequential sedation as per above, a digital rectal exam was performed demonstrating internal hemorrhoids. The Olympus videocolonoscope  was inserted in the rectum and carefully advanced to the cecum, which was identified by the ileocecal valve and appendiceal orifice. The cecum was identified by the ileocecal valve and appendiceal orifice. The quality of preparation was excellent. The colonoscope was slowly withdrawn with careful evaluation between folds. Retroflexion in the rectum was completed . Findings:   Rectum: normal  Sigmoid: normal  Descending Colon: normal  Transverse Colon: normal  Ascending Colon: normal  Cecum: normal        Specimen Removed:  none    Complications: None. EBL:  None. Impression:    see findings    Recommendations: --Repeat colonoscopy in 3 years.         Signed By: Jaycob Alejandro MD     1/15/2020  2:27 PM

## 2020-01-15 NOTE — ROUTINE PROCESS
[facility] 1950 
[id number] Situation: 
Verbal report received from: Chelly Tia 
Procedure: Procedure(s): 
COLONOSCOPY Background: 
 
Preoperative diagnosis: HISTORY COLON CANCER, HISTORY OF POLYP Postoperative diagnosis: Normal 
 
:  Dr. Dawit Reed Assistant(s): Endoscopy Technician-1: Laxmi Jordan Endoscopy RN-1: Vahe Hein RN Specimens: * No specimens in log * H. Pylori  no Assessment: 
Intra-procedure medications Anesthesia gave intra-procedure sedation and medications, see anesthesia flow sheet yes Intravenous fluids: NS@ Ct Glaze Vital signs stable Abdominal assessment: round and soft Recommendation: 
Discharge patient per MD order. Family or Friend Permission to share finding with family or friend yes

## 2020-01-15 NOTE — ANESTHESIA POSTPROCEDURE EVALUATION
Post-Anesthesia Evaluation and Assessment    Patient: Estrella Butts MRN: [id number]  SSN: [id number]    YOB: 1950  Age: 77 y.o. Sex: female      I have evaluated the patient and they are stable and ready for discharge from the PACU. Cardiovascular Function/Vital Signs  Visit Vitals  /72   Pulse 84   Temp 36.8 °C (98.2 °F)   Resp 12   Ht 5' 5\" (1.651 m)   Wt 95.6 kg (210 lb 12 oz)   SpO2 97%   Breastfeeding No   BMI 35.07 kg/m²       Patient is status post MAC anesthesia for Procedure(s):  COLONOSCOPY. Nausea/Vomiting: None    Postoperative hydration reviewed and adequate. Pain:  Pain Scale 1: Numeric (0 - 10) (01/15/20 1307)  Pain Intensity 1: 0 (01/15/20 1307)   Managed    Neurological Status: At baseline    Mental Status, Level of Consciousness: Alert and  oriented to person, place, and time    Pulmonary Status:   O2 Device: Room air (01/15/20 1433)   Adequate oxygenation and airway patent    Complications related to anesthesia: None    Post-anesthesia assessment completed. No concerns    Signed By: Brisa Madrigal MD     January 15, 2020              Procedure(s):  COLONOSCOPY. MAC    <BSHSIANPOST>    Vitals Value Taken Time   /72 1/15/2020  2:37 PM   Temp 36.8 °C (98.2 °F) 1/15/2020  2:33 PM   Pulse 85 1/15/2020  2:42 PM   Resp 17 1/15/2020  2:42 PM   SpO2 98 % 1/15/2020  2:42 PM   Vitals shown include unvalidated device data.

## 2020-01-15 NOTE — H&P
[facility]      History and Physical       NAME:  Lindsay Wong   :   1950   MRN:   [id number]             History of Present Illness:  Patient is a 77 y.o. who is seen for h/o colon cancer. PMH:  Past Medical History:   Diagnosis Date    Cancer [facility])     rectal surgery/chemo/xrt    Chronic fatigue     Colon polyps     Diabetes ([address]     type 2    Fatty liver     Headache(784.0)     migraine    Hypercholesterolemia     Hypertension     PCOS (polycystic ovarian syndrome)     Unspecified adverse effect of anesthesia     \"vagal Response during cholecystectomy requiring ICU admission\" no problems with other surgeries       PSH:  Past Surgical History:   Procedure Laterality Date    BREAST SURGERY PROCEDURE UNLISTED      breast reduction    COLONOSCOPY N/A 10/18/2016    COLONOSCOPY performed by Enzo Olszewski, MD at [facility]. [address], COLON, DIAGNOSTIC      rectal resection 3/06    HX CHOLECYSTECTOMY      HX COLECTOMY  2005    HX HEENT  2004    parathyroid gland removed x 2    HX HEENT      lipoma- forehead    HX HERNIA REPAIR      X2 surgical and inguinal    HX HYSTERECTOMY       PT hystorectomy USO    HX ORTHOPAEDIC  2014    r foot ganglion       Allergies: Allergies   Allergen Reactions    Clindamycin Other (comments)     colitis    Codeine Other (comments)     mental    Crestor [Rosuvastatin] Myalgia    Metformin Diarrhea    Pcn [Penicillins] Hives    Vicodin [Hydrocodone-Acetaminophen] Other (comments)     hallucinations       Home Medications:  Prior to Admission Medications   Prescriptions Last Dose Informant Patient Reported? Taking?    ALPRAZolam (XANAX) 0.5 mg tablet 2020 at Unknown time  No Yes   Sig: TAKE ONE TABLET BY MOUTH THREE TIMES A DAY AS NEEDED FOR ANXIETY   amLODIPine-benazepril (LOTREL) 5-20 mg per capsule 2020 at Unknown time  No Yes   Sig: TAKE ONE CAPSULE BY MOUTH DAILY amitriptyline (ELAVIL) 50 mg tablet 1/14/2020 at Unknown time  No Yes   Sig: TAKE 4 TABLETS BY MOUTH NIGHTLY   aspirin delayed-release 81 mg tablet 1/8/2020  Yes No   Sig: Take 81 mg by mouth. 2 po qd    butalbital-acetaminophen-caffeine (FIORICET, ESGIC) -40 mg per tablet 12/15/2019 at Unknown time  No Yes   Sig: TAKE ONE TABLET BY MOUTH EVERY 4 HOURS AS NEEDED FOR HEADACHE   dicyclomine (BENTYL) 10 mg capsule 1/8/2020 at Unknown time  Yes Yes   Sig: Take 10 mg by mouth daily. escitalopram oxalate (LEXAPRO) 10 mg tablet 1/13/2020  No No   Sig: TAKE ONE TABLET BY MOUTH DAILY   fenofibrate micronized (LOFIBRA) 200 mg capsule 1/13/2020  No No   Sig: TAKE ONE CAPSULE BY MOUTH DAILY   glipiZIDE SR (GLUCOTROL XL) 10 mg CR tablet 1/13/2020  No No   Sig: TAKE ONE TABLET BY MOUTH DAILY   mometasone (ELOCON) 0.1 % topical cream Not Taking at Unknown time  No No   Sig: Apply  to affected area daily. Patient taking differently: Apply  to affected area daily as needed. omega-3 fatty acids-vitamin e (FISH OIL) 1,000 mg Cap 1/13/2020  No No   Sig: Take 1,000 mg by mouth four (4) times daily. pioglitazone (ACTOS) 15 mg tablet 1/13/2020  No No   Sig: Take 1 Tab by mouth daily. triamcinolone acetonide (NASACORT AQ NA) 1/14/2020  Yes No   Sig: by Nasal route daily.       Facility-Administered Medications: None       Hospital Medications:  Current Facility-Administered Medications   Medication Dose Route Frequency    0.9% sodium chloride infusion  50 mL/hr IntraVENous CONTINUOUS    sodium chloride (NS) flush 5-40 mL  5-40 mL IntraVENous Q8H    sodium chloride (NS) flush 5-40 mL  5-40 mL IntraVENous PRN    midazolam (VERSED) injection 0.25-5 mg  0.25-5 mg IntraVENous Multiple    fentaNYL citrate (PF) injection  mcg   mcg IntraVENous Multiple    naloxone (NARCAN) injection 0.4 mg  0.4 mg IntraVENous Multiple    flumazenil (ROMAZICON) 0.1 mg/mL injection 0.2 mg  0.2 mg IntraVENous Multiple    simethicone (MYLICON) 50XG/9.7NL oral drops 80 mg  1.2 mL Oral Multiple    atropine injection 0.5 mg  0.5 mg IntraVENous ONCE PRN    EPINEPHrine (ADRENALIN) 0.1 mg/mL syringe 1 mg  1 mg Endoscopically ONCE PRN       Social History:  Social History     Tobacco Use    Smoking status: Former Smoker     Packs/day: 1.00     Years: 20.00     Pack years: 20.00     Last attempt to quit: 1991     Years since quittin.1    Smokeless tobacco: Never Used   Substance Use Topics    Alcohol use: No       Family History:  Family History   Problem Relation Age of Onset    Cancer Mother         gallbladder    Stroke Father         cerebral hemorrhage    Cancer Brother         leukemia             Review of Systems:      Constitutional: negative fever, negative chills, negative weight loss  Eyes:   negative visual changes  ENT:   negative sore throat, tongue or lip swelling  Respiratory:  negative cough, negative dyspnea  Cards:  negative for chest pain, palpitations, lower extremity edema  GI:   See HPI  :  negative for frequency, dysuria  Integument:  negative for rash and pruritus  Heme:  negative for easy bruising and gum/nose bleeding  Musculoskel: negative for myalgias,  back pain and muscle weakness  Neuro: negative for headaches, dizziness, vertigo  Psych:  negative for feelings of anxiety, depression       Objective:     Patient Vitals for the past 8 hrs:   BP Temp Pulse Resp SpO2 Height Weight   01/15/20 1307 161/90 98.3 °F (36.8 °C) 86 18 96 % 5' 5\" (1.651 m) 95.6 kg (210 lb 12 oz)     No intake/output data recorded. No intake/output data recorded. EXAM:     NEURO-a&o   HEENT-wnl   LUNGS-clear    COR-regular rate and rhythym     ABD-soft , no tenderness, no rebound, good bs     EXT-no edema     Data Review     No results for input(s): WBC, HGB, HCT, PLT, HGBEXT, HCTEXT, PLTEXT in the last 72 hours. No results for input(s): NA, K, CL, CO2, BUN, CREA, GLU, PHOS, CA in the last 72 hours.   No results for input(s): SGOT, GPT, AP, TBIL, TP, ALB, GLOB, GGT, AML, LPSE in the last 72 hours. No lab exists for component: AMYP, HLPSE  No results for input(s): INR, PTP, APTT, INREXT in the last 72 hours. Assessment:   · H/o colon cancer     Patient Active Problem List   Diagnosis Code    Essential hypertension, benign I10    Chronic fatigue R53.82    Benign neoplasm of colon D12.6    Migraine headache G43.909    Other chronic nonalcoholic liver disease M60.67    Contact dermatitis and other eczema, due to unspecified cause L25.9    Other acne L70.8    Stasis edema I87.309    Dysthymia F34.1    Diabetes mellitus type 2, uncontrolled, without complications ([facility] [address] N39.29    Mixed hyperlipidemia E78.2    Rectal cancer ([facility] [address] C20    Severe obesity (BMI 35.0-39. 9) E66.01           Plan:   · Endoscopic procedure with sedation     Signed By: Jaycob Alejandro MD     1/15/2020  2:07 PM

## 2020-01-15 NOTE — ANESTHESIA PREPROCEDURE EVALUATION
Relevant Problems   No relevant active problems       Anesthetic History   No history of anesthetic complications            Review of Systems / Medical History  Patient summary reviewed, nursing notes reviewed and pertinent labs reviewed    Pulmonary  Within defined limits                 Neuro/Psych         Psychiatric history     Cardiovascular    Hypertension              Exercise tolerance: >4 METS     GI/Hepatic/Renal           Liver disease     Endo/Other    Diabetes    Obesity     Other Findings              Physical Exam    Airway  Mallampati: II  TM Distance: > 6 cm  Neck ROM: normal range of motion   Mouth opening: Normal     Cardiovascular    Rhythm: regular  Rate: normal         Dental  No notable dental hx       Pulmonary  Breath sounds clear to auscultation               Abdominal         Other Findings            Anesthetic Plan    ASA: 2  Anesthesia type: MAC          Induction: Intravenous  Anesthetic plan and risks discussed with: Patient

## 2020-01-21 DIAGNOSIS — F34.1 DYSTHYMIA: ICD-10-CM

## 2020-01-22 RX ORDER — ESCITALOPRAM OXALATE 10 MG/1
TABLET ORAL
Qty: 30 TAB | Refills: 2 | Status: SHIPPED | OUTPATIENT
Start: 2020-01-22 | End: 2020-05-21

## 2020-02-21 DIAGNOSIS — I10 ESSENTIAL HYPERTENSION, BENIGN: ICD-10-CM

## 2020-02-21 RX ORDER — AMLODIPINE AND BENAZEPRIL HYDROCHLORIDE 5; 20 MG/1; MG/1
CAPSULE ORAL
Qty: 30 CAP | Refills: 5 | OUTPATIENT
Start: 2020-02-21

## 2020-02-27 DIAGNOSIS — F34.1 DYSTHYMIA: ICD-10-CM

## 2020-02-27 RX ORDER — ALPRAZOLAM 0.5 MG/1
TABLET ORAL
Qty: 20 TAB | Refills: 0 | Status: SHIPPED | OUTPATIENT
Start: 2020-02-27 | End: 2020-05-23

## 2020-03-04 RX ORDER — GLIPIZIDE 10 MG/1
TABLET, FILM COATED, EXTENDED RELEASE ORAL
Qty: 20 TAB | Refills: 0 | Status: SHIPPED | OUTPATIENT
Start: 2020-03-04 | End: 2020-04-08

## 2020-03-16 ENCOUNTER — OFFICE VISIT (OUTPATIENT)
Dept: INTERNAL MEDICINE CLINIC | Age: 70
End: 2020-03-16

## 2020-03-16 VITALS
HEIGHT: 65 IN | RESPIRATION RATE: 16 BRPM | BODY MASS INDEX: 36.62 KG/M2 | OXYGEN SATURATION: 98 % | DIASTOLIC BLOOD PRESSURE: 84 MMHG | HEART RATE: 86 BPM | SYSTOLIC BLOOD PRESSURE: 136 MMHG | TEMPERATURE: 97.8 F | WEIGHT: 219.8 LBS

## 2020-03-16 DIAGNOSIS — E78.2 MIXED HYPERLIPIDEMIA: ICD-10-CM

## 2020-03-16 DIAGNOSIS — H61.23 CERUMINOSIS, BILATERAL: ICD-10-CM

## 2020-03-16 DIAGNOSIS — I10 ESSENTIAL HYPERTENSION, BENIGN: Primary | ICD-10-CM

## 2020-03-16 NOTE — PROGRESS NOTES
HISTORY OF PRESENT ILLNESS  Andrew Daley is a 77 y.o. female. HPI Subjective:  Kenyetta is seen today for follow up of diabetes, hyperlipidemia and other problems. 1. Diabetes, hyperlipidemia. She is due for routine labs. Denies medication side effects. 2. Hypertension, stable on current regimen. 3. Cataracts. These are bilateral and surgery is pending. Review of Systems:  Notable for some upper respiratory infection symptoms. She has had [facility] out twice. The first time she received Amoxicillin with a mild improvement. The second visit she was given prednisone and had a moderate improvement. She now feels generally much better. She denies sore throat or postnasal drip. She does take Nasacort now. She also believes she has ear wax impaction. Current Outpatient Medications   Medication Sig    glipiZIDE SR (GLUCOTROL XL) 10 mg CR tablet TAKE ONE TABLET BY MOUTH DAILY    ALPRAZolam (XANAX) 0.5 mg tablet TAKE ONE TABLET BY MOUTH THREE TIMES A DAY AS NEEDED FOR ANXIETY    escitalopram oxalate (LEXAPRO) 10 mg tablet TAKE ONE TABLET BY MOUTH DAILY    amLODIPine-benazepril (LOTREL) 5-20 mg per capsule TAKE ONE CAPSULE BY MOUTH DAILY    fenofibrate micronized (LOFIBRA) 200 mg capsule TAKE ONE CAPSULE BY MOUTH DAILY    amitriptyline (ELAVIL) 50 mg tablet TAKE 4 TABLETS BY MOUTH NIGHTLY    pioglitazone (ACTOS) 15 mg tablet Take 1 Tab by mouth daily.  butalbital-acetaminophen-caffeine (FIORICET, ESGIC) -40 mg per tablet TAKE ONE TABLET BY MOUTH EVERY 4 HOURS AS NEEDED FOR HEADACHE    triamcinolone acetonide (NASACORT AQ NA) by Nasal route daily.  dicyclomine (BENTYL) 10 mg capsule Take 10 mg by mouth daily.  omega-3 fatty acids-vitamin e (FISH OIL) 1,000 mg Cap Take 1,000 mg by mouth four (4) times daily.  aspirin delayed-release 81 mg tablet Take 162 mg by mouth. 2 po qd      No current facility-administered medications for this visit.           Review of Systems Constitutional: Negative for weight loss. Respiratory: Negative. Cardiovascular: Negative for chest pain, palpitations, leg swelling and PND. Musculoskeletal: Negative for myalgias. Neurological: Negative for focal weakness. Physical Exam  Vitals signs and nursing note reviewed. HENT:      Right Ear: There is impacted cerumen. Left Ear: Tympanic membrane and ear canal normal.   Neck:      Musculoskeletal: Neck supple. Vascular: No carotid bruit. Cardiovascular:      Rate and Rhythm: Normal rate and regular rhythm. Heart sounds: No murmur. No friction rub. No gallop. Pulmonary:      Effort: Pulmonary effort is normal. No respiratory distress. Breath sounds: Normal breath sounds. Lymphadenopathy:      Cervical: No cervical adenopathy. ASSESSMENT and PLAN  Diagnoses and all orders for this visit:    1. Essential hypertension, benign  -     METABOLIC PANEL, BASIC, Continue current regimen of prescription and / or OTC medications     2. Diabetes mellitus type 2, controlled, without complications ([facility] [address]  -     METABOLIC PANEL, BASIC  -     HEMOGLOBIN A1C WITH EAG  -     MICROALBUMIN, UR, RAND W/ MICROALB/CREAT RATIO    3.  Mixed hyperlipidemia  -     LIPID PANEL  -     HEPATIC FUNCTION PANEL    4. Ceruminosis, bilateral  -     REMOVAL IMPACTED CERUMEN IRRIGATION/LVG UNILAT

## 2020-03-30 DIAGNOSIS — E78.2 MIXED HYPERLIPIDEMIA: ICD-10-CM

## 2020-03-30 RX ORDER — FENOFIBRATE 200 MG/1
CAPSULE ORAL
Qty: 30 CAP | Refills: 5 | Status: SHIPPED | OUTPATIENT
Start: 2020-03-30 | End: 2021-03-09

## 2020-04-07 DIAGNOSIS — I10 ESSENTIAL HYPERTENSION, BENIGN: ICD-10-CM

## 2020-04-07 RX ORDER — AMLODIPINE AND BENAZEPRIL HYDROCHLORIDE 5; 20 MG/1; MG/1
CAPSULE ORAL
Qty: 30 CAP | Refills: 4 | Status: SHIPPED | OUTPATIENT
Start: 2020-04-07 | End: 2020-08-25

## 2020-04-07 RX ORDER — AMITRIPTYLINE HYDROCHLORIDE 50 MG/1
TABLET, FILM COATED ORAL
Qty: 360 TAB | Refills: 0 | Status: SHIPPED | OUTPATIENT
Start: 2020-04-07 | End: 2020-07-24

## 2020-05-20 DIAGNOSIS — E11.9 CONTROLLED TYPE 2 DIABETES MELLITUS WITHOUT COMPLICATION, WITHOUT LONG-TERM CURRENT USE OF INSULIN (HCC): ICD-10-CM

## 2020-05-20 DIAGNOSIS — F34.1 DYSTHYMIA: ICD-10-CM

## 2020-05-21 RX ORDER — PIOGLITAZONEHYDROCHLORIDE 15 MG/1
TABLET ORAL
Qty: 90 TAB | Refills: 0 | Status: SHIPPED | OUTPATIENT
Start: 2020-05-21 | End: 2020-11-10

## 2020-05-21 RX ORDER — ESCITALOPRAM OXALATE 10 MG/1
TABLET ORAL
Qty: 30 TAB | Refills: 1 | Status: SHIPPED | OUTPATIENT
Start: 2020-05-21 | End: 2021-06-24

## 2020-05-22 DIAGNOSIS — F34.1 DYSTHYMIA: ICD-10-CM

## 2020-05-23 RX ORDER — ALPRAZOLAM 0.5 MG/1
TABLET ORAL
Qty: 20 TAB | Refills: 0 | Status: SHIPPED | OUTPATIENT
Start: 2020-05-23 | End: 2020-07-30

## 2020-07-15 DIAGNOSIS — G43.709 CHRONIC MIGRAINE WITHOUT AURA WITHOUT STATUS MIGRAINOSUS, NOT INTRACTABLE: ICD-10-CM

## 2020-07-16 RX ORDER — BUTALBITAL, ACETAMINOPHEN AND CAFFEINE 50; 325; 40 MG/1; MG/1; MG/1
TABLET ORAL
Qty: 30 TAB | Refills: 1 | Status: SHIPPED | OUTPATIENT
Start: 2020-07-16 | End: 2022-02-25

## 2020-07-24 RX ORDER — AMITRIPTYLINE HYDROCHLORIDE 50 MG/1
TABLET, FILM COATED ORAL
Qty: 360 TAB | Refills: 0 | Status: SHIPPED | OUTPATIENT
Start: 2020-07-24 | End: 2020-10-20

## 2020-07-29 DIAGNOSIS — F34.1 DYSTHYMIA: ICD-10-CM

## 2020-07-30 RX ORDER — ALPRAZOLAM 0.5 MG/1
TABLET ORAL
Qty: 20 TAB | Refills: 0 | Status: SHIPPED | OUTPATIENT
Start: 2020-07-30 | End: 2020-10-20

## 2020-10-19 DIAGNOSIS — F34.1 DYSTHYMIA: ICD-10-CM

## 2020-10-20 RX ORDER — ALPRAZOLAM 0.5 MG/1
TABLET ORAL
Qty: 20 TAB | Refills: 0 | Status: SHIPPED | OUTPATIENT
Start: 2020-10-20 | End: 2021-04-26

## 2020-10-20 RX ORDER — AMITRIPTYLINE HYDROCHLORIDE 50 MG/1
TABLET, FILM COATED ORAL
Qty: 360 TAB | Refills: 0 | Status: SHIPPED | OUTPATIENT
Start: 2020-10-20 | End: 2021-01-26

## 2020-10-28 ENCOUNTER — OFFICE VISIT (OUTPATIENT)
Dept: INTERNAL MEDICINE CLINIC | Age: 70
End: 2020-10-28
Payer: MEDICARE

## 2020-10-28 VITALS
HEART RATE: 80 BPM | OXYGEN SATURATION: 100 % | DIASTOLIC BLOOD PRESSURE: 87 MMHG | SYSTOLIC BLOOD PRESSURE: 147 MMHG | BODY MASS INDEX: 36.35 KG/M2 | HEIGHT: 65 IN | TEMPERATURE: 97.5 F | WEIGHT: 218.2 LBS | RESPIRATION RATE: 20 BRPM

## 2020-10-28 DIAGNOSIS — E78.2 MIXED HYPERLIPIDEMIA: ICD-10-CM

## 2020-10-28 DIAGNOSIS — C20 RECTAL CANCER (HCC): ICD-10-CM

## 2020-10-28 DIAGNOSIS — F34.1 DYSTHYMIA: ICD-10-CM

## 2020-10-28 DIAGNOSIS — E66.01 SEVERE OBESITY WITH BODY MASS INDEX (BMI) OF 35.0 TO 39.9 WITH SERIOUS COMORBIDITY (HCC): ICD-10-CM

## 2020-10-28 DIAGNOSIS — I10 ESSENTIAL HYPERTENSION, BENIGN: ICD-10-CM

## 2020-10-28 DIAGNOSIS — E11.9 CONTROLLED TYPE 2 DIABETES MELLITUS WITHOUT COMPLICATION, WITHOUT LONG-TERM CURRENT USE OF INSULIN (HCC): Primary | ICD-10-CM

## 2020-10-28 PROCEDURE — G8417 CALC BMI ABV UP PARAM F/U: HCPCS | Performed by: INTERNAL MEDICINE

## 2020-10-28 PROCEDURE — G9899 SCRN MAM PERF RSLTS DOC: HCPCS | Performed by: INTERNAL MEDICINE

## 2020-10-28 PROCEDURE — G8427 DOCREV CUR MEDS BY ELIG CLIN: HCPCS | Performed by: INTERNAL MEDICINE

## 2020-10-28 PROCEDURE — 99214 OFFICE O/P EST MOD 30 MIN: CPT | Performed by: INTERNAL MEDICINE

## 2020-10-28 PROCEDURE — 1090F PRES/ABSN URINE INCON ASSESS: CPT | Performed by: INTERNAL MEDICINE

## 2020-10-28 PROCEDURE — G8399 PT W/DXA RESULTS DOCUMENT: HCPCS | Performed by: INTERNAL MEDICINE

## 2020-10-28 PROCEDURE — G8536 NO DOC ELDER MAL SCRN: HCPCS | Performed by: INTERNAL MEDICINE

## 2020-10-28 PROCEDURE — G8754 DIAS BP LESS 90: HCPCS | Performed by: INTERNAL MEDICINE

## 2020-10-28 PROCEDURE — 2022F DILAT RTA XM EVC RTNOPTHY: CPT | Performed by: INTERNAL MEDICINE

## 2020-10-28 PROCEDURE — 1101F PT FALLS ASSESS-DOCD LE1/YR: CPT | Performed by: INTERNAL MEDICINE

## 2020-10-28 PROCEDURE — 3046F HEMOGLOBIN A1C LEVEL >9.0%: CPT | Performed by: INTERNAL MEDICINE

## 2020-10-28 PROCEDURE — G9717 DOC PT DX DEP/BP F/U NT REQ: HCPCS | Performed by: INTERNAL MEDICINE

## 2020-10-28 PROCEDURE — G0463 HOSPITAL OUTPT CLINIC VISIT: HCPCS | Performed by: INTERNAL MEDICINE

## 2020-10-28 PROCEDURE — G9711 PT HX TOT COL OR COLON CA: HCPCS | Performed by: INTERNAL MEDICINE

## 2020-10-28 PROCEDURE — G8753 SYS BP > OR = 140: HCPCS | Performed by: INTERNAL MEDICINE

## 2020-10-28 NOTE — PROGRESS NOTES
HISTORY OF PRESENT ILLNESS  Yang Smith is a 72 y.o. female. HPI Kenyetta is seen today for follow up of diabetes and other problems. 1. Diabetes, hyperlipidemia. Overdue for routine labs. 2. Rectal cancer. She has had a sustained remission. 3. Dysthymia. She no longer takes Lexapro. 4. Preventive medicine. Brief review today. Social history notable for the [employer] business doing very well through the pandemic especially in light of people not eating out as much anymore. Past Medical History:   Diagnosis Date    Cancer [facility])     rectal surgery/chemo/xrt    Chronic fatigue     Colon polyps     Diabetes ([facility] [address]     type 2    Fatty liver     Headache(784.0)     migraine    Hypercholesterolemia     Hypertension     PCOS (polycystic ovarian syndrome)     Unspecified adverse effect of anesthesia     \"vagal Response during cholecystectomy requiring ICU admission\" no problems with other surgeries         Review of Systems   Constitutional: Positive for malaise/fatigue. Negative for chills, fever and weight loss. Chronic, wax and wane   Respiratory: Negative. Cardiovascular: Negative for chest pain, palpitations, leg swelling and PND. Musculoskeletal: Negative for myalgias. Neurological: Negative for focal weakness. Psychiatric/Behavioral: Negative for depression. The patient is not nervous/anxious. Physical Exam  Vitals signs and nursing note reviewed. Neck:      Vascular: No carotid bruit. Cardiovascular:      Rate and Rhythm: Normal rate and regular rhythm. Heart sounds: No murmur. No friction rub. No gallop. Pulmonary:      Effort: Pulmonary effort is normal. No respiratory distress. Breath sounds: Normal breath sounds. ASSESSMENT and PLAN  Diagnoses and all orders for this visit:    1. Controlled type 2 diabetes mellitus without complication, without long-term current use of insulin ([facility] [address]    2. Dysthymia- Follow off treatment     3.  Rectal cancer [facility])- See specialists  as directed. 4. Severe obesity with body mass index (BMI) of 35.0 to 39.9 with serious comorbidity ([address]- Diet and exercise     5. Essential hypertension, benign- Continue current regimen of prescription and / or OTC medications     6. Mixed hyperlipidemia- Check lipid panel and appropriate studies to rule out med side effects now and in 6 months- high risk med management.

## 2020-11-09 DIAGNOSIS — E11.9 CONTROLLED TYPE 2 DIABETES MELLITUS WITHOUT COMPLICATION, WITHOUT LONG-TERM CURRENT USE OF INSULIN (HCC): ICD-10-CM

## 2020-11-10 RX ORDER — PIOGLITAZONEHYDROCHLORIDE 15 MG/1
TABLET ORAL
Qty: 90 TAB | Refills: 0 | Status: SHIPPED | OUTPATIENT
Start: 2020-11-10 | End: 2021-02-26

## 2020-12-19 RX ORDER — GLIPIZIDE 10 MG/1
TABLET, FILM COATED, EXTENDED RELEASE ORAL
Qty: 90 TAB | Refills: 0 | Status: SHIPPED | OUTPATIENT
Start: 2020-12-19 | End: 2021-01-08

## 2020-12-31 DIAGNOSIS — I10 ESSENTIAL HYPERTENSION, BENIGN: ICD-10-CM

## 2020-12-31 RX ORDER — AMLODIPINE AND BENAZEPRIL HYDROCHLORIDE 5; 20 MG/1; MG/1
CAPSULE ORAL
Qty: 90 CAP | Refills: 1 | Status: SHIPPED | OUTPATIENT
Start: 2020-12-31 | End: 2021-09-11 | Stop reason: SDUPTHER

## 2020-12-31 NOTE — TELEPHONE ENCOUNTER
Requested Prescriptions     Pending Prescriptions Disp Refills    amLODIPine-benazepril (LOTREL) 5-20 mg per capsule 90 Cap 0

## 2021-01-08 RX ORDER — GLIPIZIDE 10 MG/1
TABLET, FILM COATED, EXTENDED RELEASE ORAL
Qty: 90 TAB | Refills: 0 | Status: SHIPPED | OUTPATIENT
Start: 2021-01-08 | End: 2021-07-30

## 2021-01-26 RX ORDER — AMITRIPTYLINE HYDROCHLORIDE 50 MG/1
TABLET, FILM COATED ORAL
Qty: 360 TAB | Refills: 0 | Status: SHIPPED | OUTPATIENT
Start: 2021-01-26 | End: 2021-04-29 | Stop reason: SDUPTHER

## 2021-01-27 ENCOUNTER — IMMUNIZATION (OUTPATIENT)
Dept: INTERNAL MEDICINE CLINIC | Age: 71
End: 2021-01-27
Payer: MEDICARE

## 2021-01-27 DIAGNOSIS — Z23 ENCOUNTER FOR IMMUNIZATION: Primary | ICD-10-CM

## 2021-01-27 PROCEDURE — 91301 COVID-19, MRNA, LNP-S, PF, 100MCG/0.5ML DOSE(MODERNA): CPT | Performed by: FAMILY MEDICINE

## 2021-01-27 PROCEDURE — 0011A PR IMM ADMN SARSCOV2 100 MCG/0.5 ML 1ST DOSE: CPT | Performed by: FAMILY MEDICINE

## 2021-01-28 ENCOUNTER — TELEPHONE (OUTPATIENT)
Dept: INTERNAL MEDICINE CLINIC | Age: 71
End: 2021-01-28

## 2021-01-28 NOTE — TELEPHONE ENCOUNTER
Spoke with pt - states she has been experiencing \"sharp pain at top of head. \" Not a headache. Happens intermittently only lasting 20 seconds when it occurs. Has been going on for about a week. Denies any dizziness, blurred vision, nausea or vomiting. Denies any injury to head. Advised pt she should schedule an appt for further evaluation. In meantime if symptoms worsen prior to her appt she should go to ER to be evaluated. Will forward to MD and Kenyon Files to schedule.

## 2021-01-28 NOTE — TELEPHONE ENCOUNTER
Pt states she has a weird pain in her head - sharp - off and on. Asking for a call about what to do.

## 2021-01-29 ENCOUNTER — VIRTUAL VISIT (OUTPATIENT)
Dept: INTERNAL MEDICINE CLINIC | Age: 71
End: 2021-01-29
Payer: MEDICARE

## 2021-01-29 DIAGNOSIS — G44.209 MUSCLE CONTRACTION HEADACHE: Primary | ICD-10-CM

## 2021-01-29 PROCEDURE — G0463 HOSPITAL OUTPT CLINIC VISIT: HCPCS | Performed by: INTERNAL MEDICINE

## 2021-01-29 PROCEDURE — 1101F PT FALLS ASSESS-DOCD LE1/YR: CPT | Performed by: INTERNAL MEDICINE

## 2021-01-29 PROCEDURE — G9717 DOC PT DX DEP/BP F/U NT REQ: HCPCS | Performed by: INTERNAL MEDICINE

## 2021-01-29 PROCEDURE — 1090F PRES/ABSN URINE INCON ASSESS: CPT | Performed by: INTERNAL MEDICINE

## 2021-01-29 PROCEDURE — G8756 NO BP MEASURE DOC: HCPCS | Performed by: INTERNAL MEDICINE

## 2021-01-29 PROCEDURE — G8399 PT W/DXA RESULTS DOCUMENT: HCPCS | Performed by: INTERNAL MEDICINE

## 2021-01-29 PROCEDURE — G8428 CUR MEDS NOT DOCUMENT: HCPCS | Performed by: INTERNAL MEDICINE

## 2021-01-29 PROCEDURE — 99214 OFFICE O/P EST MOD 30 MIN: CPT | Performed by: INTERNAL MEDICINE

## 2021-01-29 PROCEDURE — G9711 PT HX TOT COL OR COLON CA: HCPCS | Performed by: INTERNAL MEDICINE

## 2021-01-29 RX ORDER — DICLOFENAC SODIUM 75 MG/1
75 TABLET, DELAYED RELEASE ORAL 2 TIMES DAILY
Qty: 14 TAB | Refills: 1 | Status: SHIPPED | OUTPATIENT
Start: 2021-01-29 | End: 2021-09-11 | Stop reason: SDUPTHER

## 2021-01-29 RX ORDER — METHOCARBAMOL 500 MG/1
500 TABLET, FILM COATED ORAL
Qty: 7 TAB | Refills: 1 | Status: SHIPPED | OUTPATIENT
Start: 2021-01-29 | End: 2021-03-24

## 2021-01-29 NOTE — PROGRESS NOTES
Verified name and birth date for privacy precautions. Chart reviewed in preparation for today's visit. Chief Complaint   Patient presents with    Head Pain          Health Maintenance Due   Topic    DTaP/Tdap/Td series (1 - Tdap)    Shingrix Vaccine Age 50> (1 of 2)    GLAUCOMA SCREENING Q2Y     Eye Exam Retinal or Dilated     Medicare Yearly Exam     Foot Exam Q1     MICROALBUMIN Q1     Lipid Screen     Flu Vaccine (1)    A1C test (Diabetic or Prediabetic)     Breast Cancer Screen Mammogram          Wt Readings from Last 3 Encounters:   10/28/20 218 lb 3.2 oz (99 kg)   03/16/20 219 lb 12.8 oz (99.7 kg)   01/15/20 210 lb 12 oz (95.6 kg)     Temp Readings from Last 3 Encounters:   10/28/20 97.5 °F (36.4 °C) (Temporal)   03/16/20 97.8 °F (36.6 °C)   01/15/20 98.2 °F (36.8 °C)     BP Readings from Last 3 Encounters:   10/28/20 (!) 147/87   03/16/20 136/84   01/15/20 132/72     Pulse Readings from Last 3 Encounters:   10/28/20 80   03/16/20 86   01/15/20 84         Learning Assessment:  :     Learning Assessment 6/11/2018 3/26/2014   PRIMARY LEARNER Patient Patient   HIGHEST LEVEL OF EDUCATION - PRIMARY LEARNER  - 4 YEARS OF COLLEGE   BARRIERS PRIMARY LEARNER - NONE   CO-LEARNER CAREGIVER - No   PRIMARY LANGUAGE ENGLISH ENGLISH   LEARNER PREFERENCE PRIMARY READING OTHER (COMMENT)     VIDEOS -   ANSWERED BY patient self   RELATIONSHIP SELF SELF       Depression Screening:  :     3 most recent PHQ Screens 1/29/2021   Little interest or pleasure in doing things Not at all   Feeling down, depressed, irritable, or hopeless Not at all   Total Score PHQ 2 0       Fall Risk Assessment:  :     Fall Risk Assessment, last 12 mths 3/16/2020   Able to walk? Yes   Fall in past 12 months? No       Abuse Screening:  :     Abuse Screening Questionnaire 1/29/2021   Do you ever feel afraid of your partner? N   Are you in a relationship with someone who physically or mentally threatens you?  N   Is it safe for you to go home? Y

## 2021-01-29 NOTE — PROGRESS NOTES
HISTORY OF PRESENT ILLNESS  Huy Seals is a 72 y.o. female. This is a real-time audio/video visit brought to you by our sponsors, the fine folks at [facility] [place] and [employer]. [employer] platform   Head Pain   The history is provided by the patient. This is a new problem. Episode onset: 3 occurrences in 10 days, lasts 20- 30 seconds. Episode frequency: every few days. The problem has not changed since onset. The headache is aggravated by an unknown factor. The pain is located in the left unilateral and parietal region. The quality of the pain is described as sharp. The pain is moderate. Pertinent negatives include no syncope, no shortness of breath, no tingling, no dizziness, no visual change and no nausea. She has tried nothing for the symptoms. Review of Systems   Respiratory: Negative for shortness of breath. Cardiovascular: Negative for syncope. Gastrointestinal: Negative for nausea. Neurological: Negative for dizziness and tingling. Physical Exam  Vitals signs and nursing note reviewed. Constitutional:       General: She is not in acute distress. Appearance: She is not ill-appearing. Skin:     General: Skin is warm and dry. Neurological:      Mental Status: She is alert. Mental status is at baseline. Coordination: Coordination normal.   Psychiatric:         Behavior: Behavior normal.         ASSESSMENT and PLAN  Diagnoses and all orders for this visit:    1. Muscle contraction headache  -     diclofenac EC (VOLTAREN) 75 mg EC tablet; Take 1 Tab by mouth two (2) times a day for 7 days. FOR HEAD PAIN  -     methocarbamoL (ROBAXIN) 500 mg tablet; Take 1 Tab by mouth nightly for 7 days.   - Call if persistent

## 2021-02-24 ENCOUNTER — IMMUNIZATION (OUTPATIENT)
Dept: INTERNAL MEDICINE CLINIC | Age: 71
End: 2021-02-24
Payer: MEDICARE

## 2021-02-24 DIAGNOSIS — Z23 ENCOUNTER FOR IMMUNIZATION: Primary | ICD-10-CM

## 2021-02-24 PROCEDURE — 0012A PR IMM ADMN SARSCOV2 100 MCG/0.5 ML 2ND DOSE: CPT | Performed by: FAMILY MEDICINE

## 2021-02-24 PROCEDURE — 91301 COVID-19, MRNA, LNP-S, PF, 100MCG/0.5ML DOSE(MODERNA): CPT | Performed by: FAMILY MEDICINE

## 2021-03-09 DIAGNOSIS — E78.2 MIXED HYPERLIPIDEMIA: ICD-10-CM

## 2021-03-09 RX ORDER — FENOFIBRATE 200 MG/1
CAPSULE ORAL
Qty: 30 CAP | Refills: 4 | Status: SHIPPED | OUTPATIENT
Start: 2021-03-09 | End: 2022-04-07

## 2021-03-19 ENCOUNTER — TELEPHONE (OUTPATIENT)
Dept: INTERNAL MEDICINE CLINIC | Age: 71
End: 2021-03-19

## 2021-03-22 NOTE — TELEPHONE ENCOUNTER
I tried calling the patient again and left a voicemail requesting that she please call back to schedule an appt

## 2021-03-26 ENCOUNTER — HOSPITAL ENCOUNTER (OUTPATIENT)
Dept: LAB | Age: 71
Discharge: HOME | End: 2021-03-26
Payer: MEDICARE

## 2021-03-26 PROCEDURE — 80076 HEPATIC FUNCTION PANEL: CPT

## 2021-03-26 PROCEDURE — 83036 HEMOGLOBIN GLYCOSYLATED A1C: CPT

## 2021-03-26 PROCEDURE — 80061 LIPID PANEL: CPT

## 2021-03-26 PROCEDURE — 80048 BASIC METABOLIC PNL TOTAL CA: CPT

## 2021-03-26 PROCEDURE — 82043 UR ALBUMIN QUANTITATIVE: CPT

## 2021-03-26 PROCEDURE — 36415 COLL VENOUS BLD VENIPUNCTURE: CPT

## 2021-03-28 LAB
ALBUMIN SERPL-MCNC: NORMAL G/DL
ALBUMIN/CREAT UR: 91 MG/G CREAT (ref 0–29)
ALP SERPL-CCNC: NORMAL U/L
ALT SERPL-CCNC: NORMAL U/L
AST SERPL-CCNC: NORMAL U/L
BILIRUB DIRECT SERPL-MCNC: NORMAL MG/DL
BILIRUB SERPL-MCNC: NORMAL MG/DL
BUN SERPL-MCNC: NORMAL MG/DL
CALCIUM SERPL-MCNC: NORMAL MG/DL
CHLORIDE SERPL-SCNC: NORMAL MMOL/L
CHOLEST SERPL-MCNC: NORMAL MG/DL
CO2 SERPL-SCNC: NORMAL MMOL/L
CREAT SERPL-MCNC: NORMAL MG/DL
CREAT UR-MCNC: 110.7 MG/DL
GLUCOSE SERPL-MCNC: NORMAL MG/DL
HBA1C MFR BLD: NORMAL %
HDLC SERPL-MCNC: NORMAL MG/DL
MICROALBUMIN UR-MCNC: 100.7 UG/ML
POTASSIUM SERPL-SCNC: NORMAL MMOL/L
PROT SERPL-MCNC: NORMAL G/DL
SODIUM SERPL-SCNC: NORMAL MMOL/L
TRIGL SERPL-MCNC: NORMAL MG/DL (ref ?–150)
VLDLC SERPL CALC-MCNC: NORMAL MG/DL

## 2021-03-28 NOTE — PROGRESS NOTES
Call- for some reason all of her blood work was cancelled.  Reorder and schedule  her for medicare wellness in late April

## 2021-03-29 DIAGNOSIS — Z79.899 ENCOUNTER FOR LONG-TERM (CURRENT) USE OF MEDICATIONS: ICD-10-CM

## 2021-03-29 DIAGNOSIS — E78.2 MIXED HYPERLIPIDEMIA: ICD-10-CM

## 2021-03-29 DIAGNOSIS — E11.9 CONTROLLED TYPE 2 DIABETES MELLITUS WITHOUT COMPLICATION, WITHOUT LONG-TERM CURRENT USE OF INSULIN (HCC): ICD-10-CM

## 2021-03-29 DIAGNOSIS — I10 ESSENTIAL HYPERTENSION, BENIGN: Primary | ICD-10-CM

## 2021-03-29 NOTE — PROGRESS NOTES
Reordered pt's labs. Called pt - left detailed message MD wants her to schedule for medicare wellness in late April. Please call office back to do this. Lab slip printed and at .

## 2021-03-30 ENCOUNTER — TELEPHONE (OUTPATIENT)
Dept: INTERNAL MEDICINE CLINIC | Age: 71
End: 2021-03-30

## 2021-03-30 LAB
ALBUMIN SERPL-MCNC: 4.5 G/DL (ref 3.8–4.8)
ALP SERPL-CCNC: 70 IU/L (ref 39–117)
ALT SERPL-CCNC: 44 IU/L (ref 0–32)
AST SERPL-CCNC: 23 IU/L (ref 0–40)
BILIRUB DIRECT SERPL-MCNC: 0.13 MG/DL (ref 0–0.4)
BILIRUB SERPL-MCNC: 0.5 MG/DL (ref 0–1.2)
BUN SERPL-MCNC: 17 MG/DL (ref 8–27)
BUN/CREAT SERPL: 20 (ref 12–28)
CALCIUM SERPL-MCNC: 9.7 MG/DL (ref 8.7–10.3)
CHLORIDE SERPL-SCNC: 101 MMOL/L (ref 96–106)
CHOLEST SERPL-MCNC: 263 MG/DL (ref 100–199)
CO2 SERPL-SCNC: 24 MMOL/L (ref 20–29)
CREAT SERPL-MCNC: 0.83 MG/DL (ref 0.57–1)
CREAT UR-MCNC: NORMAL MG/DL
EST. AVERAGE GLUCOSE BLD GHB EST-MCNC: 154 MG/DL
GLUCOSE SERPL-MCNC: 185 MG/DL (ref 65–99)
HBA1C MFR BLD: 7 % (ref 4.8–5.6)
HDLC SERPL-MCNC: 43 MG/DL
LDLC SERPL CALC-MCNC: 183 MG/DL (ref 0–99)
MICROALBUMIN UR-MCNC: NORMAL
POTASSIUM SERPL-SCNC: 5.1 MMOL/L (ref 3.5–5.2)
PROT SERPL-MCNC: 7.2 G/DL (ref 6–8.5)
SODIUM SERPL-SCNC: 137 MMOL/L (ref 134–144)
TRIGL SERPL-MCNC: 197 MG/DL (ref 0–149)
VLDLC SERPL CALC-MCNC: 37 MG/DL (ref 5–40)

## 2021-03-30 NOTE — TELEPHONE ENCOUNTER
Bufford Krabbe called to let me know pt did her labs Friday 3/26/21. Will forward to MD - all was done for her physical except CBC, TSH and urinalysis.

## 2021-03-30 NOTE — TELEPHONE ENCOUNTER
Leeann Main (Self) [phone] (H)     Returning marianne's call. Pt wants her to know that she scheduled her cpe and had labs done last Friday.   She does not need to call her if that is all she was calling pt about

## 2021-04-22 ENCOUNTER — OFFICE VISIT (OUTPATIENT)
Dept: INTERNAL MEDICINE CLINIC | Age: 71
End: 2021-04-22
Payer: MEDICARE

## 2021-04-22 VITALS
HEIGHT: 64 IN | RESPIRATION RATE: 16 BRPM | BODY MASS INDEX: 36.02 KG/M2 | WEIGHT: 211 LBS | HEART RATE: 93 BPM | SYSTOLIC BLOOD PRESSURE: 122 MMHG | DIASTOLIC BLOOD PRESSURE: 82 MMHG | OXYGEN SATURATION: 95 % | TEMPERATURE: 97.3 F

## 2021-04-22 DIAGNOSIS — C20 RECTAL CANCER (HCC): ICD-10-CM

## 2021-04-22 DIAGNOSIS — E11.9 ENCOUNTER FOR DIABETIC FOOT EXAM (HCC): ICD-10-CM

## 2021-04-22 DIAGNOSIS — I10 ESSENTIAL HYPERTENSION, BENIGN: ICD-10-CM

## 2021-04-22 DIAGNOSIS — Z12.31 ENCOUNTER FOR SCREENING MAMMOGRAM FOR MALIGNANT NEOPLASM OF BREAST: ICD-10-CM

## 2021-04-22 DIAGNOSIS — R30.0 DYSURIA: ICD-10-CM

## 2021-04-22 DIAGNOSIS — E66.01 SEVERE OBESITY WITH BODY MASS INDEX (BMI) OF 35.0 TO 39.9 WITH SERIOUS COMORBIDITY (HCC): ICD-10-CM

## 2021-04-22 DIAGNOSIS — H61.23 BILATERAL IMPACTED CERUMEN: ICD-10-CM

## 2021-04-22 DIAGNOSIS — E78.2 MIXED HYPERLIPIDEMIA: ICD-10-CM

## 2021-04-22 DIAGNOSIS — E11.9 CONTROLLED TYPE 2 DIABETES MELLITUS WITHOUT COMPLICATION, WITHOUT LONG-TERM CURRENT USE OF INSULIN (HCC): ICD-10-CM

## 2021-04-22 DIAGNOSIS — Z00.00 MEDICARE ANNUAL WELLNESS VISIT, SUBSEQUENT: Primary | ICD-10-CM

## 2021-04-22 DIAGNOSIS — Z13.31 SCREENING FOR DEPRESSION: ICD-10-CM

## 2021-04-22 PROCEDURE — G8427 DOCREV CUR MEDS BY ELIG CLIN: HCPCS | Performed by: INTERNAL MEDICINE

## 2021-04-22 PROCEDURE — 69209 REMOVE IMPACTED EAR WAX UNI: CPT | Performed by: INTERNAL MEDICINE

## 2021-04-22 PROCEDURE — G8754 DIAS BP LESS 90: HCPCS | Performed by: INTERNAL MEDICINE

## 2021-04-22 PROCEDURE — G9711 PT HX TOT COL OR COLON CA: HCPCS | Performed by: INTERNAL MEDICINE

## 2021-04-22 PROCEDURE — G8536 NO DOC ELDER MAL SCRN: HCPCS | Performed by: INTERNAL MEDICINE

## 2021-04-22 PROCEDURE — G8752 SYS BP LESS 140: HCPCS | Performed by: INTERNAL MEDICINE

## 2021-04-22 PROCEDURE — 2022F DILAT RTA XM EVC RTNOPTHY: CPT | Performed by: INTERNAL MEDICINE

## 2021-04-22 PROCEDURE — G8399 PT W/DXA RESULTS DOCUMENT: HCPCS | Performed by: INTERNAL MEDICINE

## 2021-04-22 PROCEDURE — G9899 SCRN MAM PERF RSLTS DOC: HCPCS | Performed by: INTERNAL MEDICINE

## 2021-04-22 PROCEDURE — G0439 PPPS, SUBSEQ VISIT: HCPCS | Performed by: INTERNAL MEDICINE

## 2021-04-22 PROCEDURE — 1101F PT FALLS ASSESS-DOCD LE1/YR: CPT | Performed by: INTERNAL MEDICINE

## 2021-04-22 PROCEDURE — G8417 CALC BMI ABV UP PARAM F/U: HCPCS | Performed by: INTERNAL MEDICINE

## 2021-04-22 PROCEDURE — G9717 DOC PT DX DEP/BP F/U NT REQ: HCPCS | Performed by: INTERNAL MEDICINE

## 2021-04-22 NOTE — PROGRESS NOTES
Verified name and birth date for privacy precautions. Chart reviewed in preparation for today's visit. Chief Complaint   Patient presents with    Complete Physical          Health Maintenance Due   Topic    DTaP/Tdap/Td series (1 - Tdap)    Shingrix Vaccine Age 49> (1 of 2)    Eye Exam Retinal or Dilated     Medicare Yearly Exam     Foot Exam Q1     Breast Cancer Screen Mammogram          Wt Readings from Last 3 Encounters:   04/22/21 211 lb (95.7 kg)   10/28/20 218 lb 3.2 oz (99 kg)   03/16/20 219 lb 12.8 oz (99.7 kg)     Temp Readings from Last 3 Encounters:   04/22/21 97.3 °F (36.3 °C) (Temporal)   10/28/20 97.5 °F (36.4 °C) (Temporal)   03/16/20 97.8 °F (36.6 °C)     BP Readings from Last 3 Encounters:   04/22/21 122/82   10/28/20 (!) 147/87   03/16/20 136/84     Pulse Readings from Last 3 Encounters:   04/22/21 93   10/28/20 80   03/16/20 86         Learning Assessment:  :     Learning Assessment 6/11/2018 3/26/2014   PRIMARY LEARNER Patient Patient   HIGHEST LEVEL OF EDUCATION - PRIMARY LEARNER  - 4 YEARS OF COLLEGE   BARRIERS PRIMARY LEARNER - NONE   CO-LEARNER CAREGIVER - No   PRIMARY LANGUAGE ENGLISH ENGLISH   LEARNER PREFERENCE PRIMARY READING OTHER (COMMENT)     VIDEOS -   ANSWERED BY patient self   RELATIONSHIP SELF SELF       Depression Screening:  :     3 most recent PHQ Screens 4/22/2021   Little interest or pleasure in doing things Not at all   Feeling down, depressed, irritable, or hopeless Not at all   Total Score PHQ 2 0       Fall Risk Assessment:  :     Fall Risk Assessment, last 12 mths 4/22/2021   Able to walk? Yes   Fall in past 12 months? 0   Do you feel unsteady? 0   Are you worried about falling 0       Abuse Screening:  :     Abuse Screening Questionnaire 4/22/2021 1/29/2021   Do you ever feel afraid of your partner? N N   Are you in a relationship with someone who physically or mentally threatens you? N N   Is it safe for you to go home?  Valentin Murray

## 2021-04-22 NOTE — PROGRESS NOTES
This is the Subsequent Medicare Annual Wellness Exam, performed 12 months or more after the Initial AWV or the last Subsequent AWV    I have reviewed the patient's medical history in detail and updated the computerized patient record. Assessment/Plan   Education and counseling provided:  Are appropriate based on today's review and evaluation    1. Medicare annual wellness visit, subsequent  2. Screening for depression  -     DEPRESSION SCREEN ANNUAL       Depression Risk Factor Screening     3 most recent PHQ Screens 4/22/2021   Little interest or pleasure in doing things Not at all   Feeling down, depressed, irritable, or hopeless Not at all   Total Score PHQ 2 0       Alcohol Risk Screen    Do you average more than 1 drink per night or more than 7 drinks a week:  No    On any one occasion in the past three months have you have had more than 3 drinks containing alcohol:  No        Functional Ability and Level of Safety    Hearing: Hearing is good. Activities of Daily Living: The home contains: no safety equipment. Patient does total self care      Ambulation: with no difficulty     Fall Risk:  Fall Risk Assessment, last 12 mths 4/22/2021   Able to walk? Yes   Fall in past 12 months? 0   Do you feel unsteady?  0   Are you worried about falling 0      Abuse Screen:  Patient is not abused       Cognitive Screening    Has your family/caregiver stated any concerns about your memory: no         Health Maintenance Due     Health Maintenance Due   Topic Date Due    DTaP/Tdap/Td series (1 - Tdap) Never done    Shingrix Vaccine Age 50> (1 of 2) Never done    Eye Exam Retinal or Dilated  11/02/2017    Foot Exam Q1  09/20/2018    Breast Cancer Screen Mammogram  11/07/2020       Patient Care Team   Patient Care Team:  Barbara Grayson MD as PCP - General  Barbara Grayson MD as PCP - [facility] Empaneled Provider  Cheryle Jasmine, MD as Consulting Provider (Endocrinology)    History     Patient Active Problem List   Diagnosis Code    Essential hypertension, benign I10    Chronic fatigue R53.82    Benign neoplasm of colon D12.6    Migraine headache G43.909    Other chronic nonalcoholic liver disease P89.42    Contact dermatitis and other eczema, due to unspecified cause L25.9    Other acne L70.8    Stasis edema I87.309    Dysthymia F34.1    Diabetes mellitus type 2, uncontrolled, without complications [id number]    Mixed hyperlipidemia E78.2    Rectal cancer ([address] C20    Severe obesity (BMI 35.0-39. 9) E66.01     Past Medical History:   Diagnosis Date    Cancer [facility])     rectal surgery/chemo/xrt    Chronic fatigue     Colon polyps     Diabetes ([address]     type 2    Fatty liver     Headache(784.0)     migraine    Hypercholesterolemia     Hypertension     PCOS (polycystic ovarian syndrome)     Unspecified adverse effect of anesthesia     \"vagal Response during cholecystectomy requiring ICU admission\" no problems with other surgeries      Past Surgical History:   Procedure Laterality Date    COLONOSCOPY N/A 10/18/2016    COLONOSCOPY performed by Tucker England MD at [facility] COLONOSCOPY N/A 1/15/2020    COLONOSCOPY performed by Yamilex Hess MD at [address] [place] [address] [place], COLON, DIAGNOSTIC      rectal resection 3/06    HX CATARACT REMOVAL Bilateral 2020    HX CHOLECYSTECTOMY      HX HEENT  2004    parathyroid gland removed x 2    HX HEENT  2013    lipoma- forehead    HX HERNIA REPAIR      X2 surgical and inguinal    HX HYSTERECTOMY       PT hystorectomy USO    HX ORTHOPAEDIC  2014    r foot ganglion    HX TOTAL COLECTOMY  2005    [place] BREAST SURGERY PROCEDURE UNLISTED      breast reduction     Current Outpatient Medications   Medication Sig Dispense Refill    methocarbamoL (ROBAXIN) 500 mg tablet Take 1 Tab by mouth nightly as needed for Muscle Spasm(s).  30 Tab 0    fenofibrate micronized (LOFIBRA) 200 mg capsule TAKE ONE CAPSULE BY MOUTH DAILY (GENERIC FOR LOFIBRA) 30 Cap 4    pioglitazone (ACTOS) 15 mg tablet TAKE ONE TABLET BY MOUTH DAILY 30 Tab 0    amitriptyline (ELAVIL) 50 mg tablet TAKE 4 TABLETS BY MOUTH NIGHTLY 360 Tab 0    glipiZIDE SR (GLUCOTROL XL) 10 mg CR tablet TAKE ONE TABLET BY MOUTH DAILY 90 Tab 0    amLODIPine-benazepril (LOTREL) 5-20 mg per capsule TAKE ONE CAPSULE BY MOUTH DAILY. 90 Cap 1    ALPRAZolam (XANAX) 0.5 mg tablet TAKE ONE TABLET BY MOUTH THREE TIMES A DAY AS NEEDED FOR ANXIETY 20 Tab 0    butalbital-acetaminophen-caffeine (FIORICET, ESGIC) -40 mg per tablet TAKE ONE TABLET BY MOUTH EVERY 4 HOURS AS NEEDED FOR HEADACHE 30 Tab 1    triamcinolone acetonide (NASACORT AQ NA) by Nasal route daily as needed.  omega-3 fatty acids-vitamin e (FISH OIL) 1,000 mg Cap Take 1,000 mg by mouth four (4) times daily. 120 Cap 11    aspirin delayed-release 81 mg tablet Take 162 mg by mouth. 2 po qd       escitalopram oxalate (LEXAPRO) 10 mg tablet TAKE ONE TABLET BY MOUTH DAILY 30 Tab 1    dicyclomine (BENTYL) 10 mg capsule Take 10 mg by mouth daily.        Allergies   Allergen Reactions    Clindamycin Other (comments)     colitis    Codeine Other (comments)     mental    Crestor [Rosuvastatin] Myalgia    Metformin Diarrhea    Pcn [Penicillins] Hives    Vicodin [Hydrocodone-Acetaminophen] Other (comments)     hallucinations       Family History   Problem Relation Age of Onset    Cancer Mother         gallbladder    Stroke Father         cerebral hemorrhage    Cancer Brother         leukemia     Social History     Tobacco Use    Smoking status: Former Smoker     Packs/day: 1.00     Years: 20.00     Pack years: 20.00     Quit date: 1991     Years since quittin.3    Smokeless tobacco: Never Used   Substance Use Topics    Alcohol use: No         Devorah Dacosta MD

## 2021-04-22 NOTE — PATIENT INSTRUCTIONS
Medicare Wellness Visit, Female The best way to live healthy is to have a lifestyle where you eat a well-balanced diet, exercise regularly, limit alcohol use, and quit all forms of tobacco/nicotine, if applicable. Regular preventive services are another way to keep healthy. Preventive services (vaccines, screening tests, monitoring & exams) can help personalize your care plan, which helps you manage your own care. Screening tests can find health problems at the earliest stages, when they are easiest to treat. [facility] follows the current, evidence-based guidelines published by the [facility] ([facility]) when recommending preventive services for our patients. Because we follow these guidelines, sometimes recommendations change over time as research supports it. (For example, mammograms used to be recommended annually. Even though Medicare will still pay for an annual mammogram, the newer guidelines recommend a mammogram every two years for women of average risk). Of course, you and your doctor may decide to screen more often for some diseases, based on your risk and your co-morbidities (chronic disease you are already diagnosed with). Preventive services for you include: - Medicare offers their members a free annual wellness visit, which is time for you and your primary care provider to discuss and plan for your preventive service needs. Take advantage of this benefit every year! 
-All adults over the age of 72 should receive the recommended pneumonia vaccines. Current USPSTF guidelines recommend a series of two vaccines for the best pneumonia protection.  
-All adults should have a flu vaccine yearly and a tetanus vaccine every 10 years.  
-All adults age 48 and older should receive the shingles vaccines (series of two vaccines).      
-All adults age 38-68 who are overweight should have a diabetes screening test once every three years.  
-All adults born between [birth year] and 1964 should be screened once for Hepatitis C. 
-Other screening tests and preventive services for persons with diabetes include: an eye exam to screen for diabetic retinopathy, a kidney function test, a foot exam, and stricter control over your cholesterol.  
-Cardiovascular screening for adults with routine risk involves an electrocardiogram (ECG) at intervals determined by your doctor.  
-Colorectal cancer screenings should be done for adults age 54-65 with no increased risk factors for colorectal cancer. There are a number of acceptable methods of screening for this type of cancer. Each test has its own benefits and drawbacks. Discuss with your doctor what is most appropriate for you during your annual wellness visit. The different tests include: colonoscopy (considered the best screening method), a fecal occult blood test, a fecal DNA test, and sigmoidoscopy. 
 
-A bone mass density test is recommended when a woman turns 65 to screen for osteoporosis. This test is only recommended one time, as a screening. Some providers will use this same test as a disease monitoring tool if you already have osteoporosis. -Breast cancer screenings are recommended every other year for women of normal risk, age 54-69. 
-Cervical cancer screenings for women over age 72 are only recommended with certain risk factors. Here is a list of your current Health Maintenance items (your personalized list of preventive services) with a due date: 
Health Maintenance Due Topic Date Due  
 DTaP/Tdap/Td  (1 - Tdap) Never done  Shingles Vaccine (1 of 2) Never done [facility] [place] Eye Exam  11/02/2017  Diabetic Foot Care  09/20/2018  Mammogram  11/07/2020

## 2021-04-22 NOTE — PROGRESS NOTES
HISTORY OF PRESENT ILLNESS  Amara Galvez is a 72 y.o. female. HPI  Assessment. Kenyetta is seen today for a Medicare Wellness Visit and follow up of chronic problems. Preventive Medicine. She is due for the shingles vaccine, Tdap booster and mammogram. She is up to date with labs, COVID vaccinations, Pneumovax, colonoscopy and eye exam.     Chronic Problems Reviewed. Blood sugar is stable. Cholesterol is elevated but she is intolerant of medication treatment. We will continue to work with diet and exercise as best she can. Hypertension is stable on current regimen. Blood pressure is stable. She does have some urinary protein but will plan on a recheck in six months. Review of Systems   Constitutional: Negative for chills, fever and weight loss. Respiratory: Negative. Cardiovascular: Negative for chest pain, palpitations, leg swelling and PND. Genitourinary: Positive for dysuria and frequency. Musculoskeletal: Negative for myalgias. Neurological: Negative for focal weakness. Physical Exam  Vitals signs and nursing note reviewed. HENT:      Right Ear: There is impacted cerumen. Left Ear: There is impacted cerumen. Ears:      Comments: Post flush, residual cerumen extracted via instrumentation, tolerated well   Neck:      Vascular: No carotid bruit. Cardiovascular:      Rate and Rhythm: Normal rate and regular rhythm. Pulses:           Dorsalis pedis pulses are 2+ on the right side and 2+ on the left side. Posterior tibial pulses are 2+ on the right side and 2+ on the left side. Heart sounds: No murmur. No friction rub. No gallop. Pulmonary:      Effort: Pulmonary effort is normal. No respiratory distress. Breath sounds: Normal breath sounds. Abdominal:      General: There is no distension. Palpations: Abdomen is soft. There is no mass. Tenderness: There is no abdominal tenderness. There is no guarding.       Comments: Exam is limited by obesity     Musculoskeletal:         General: No swelling. Right lower leg: No edema. Left lower leg: No edema. Feet:      Right foot:      Protective Sensation: 3 sites tested. 3 sites sensed. Skin integrity: Skin integrity normal. No ulcer. Left foot:      Protective Sensation: 3 sites tested. 3 sites sensed. Skin integrity: Skin integrity normal. No ulcer. Skin:     General: Skin is warm and dry. Neurological:      Mental Status: She is alert. Deep Tendon Reflexes: Reflexes normal.   Psychiatric:         Behavior: Behavior normal.         ASSESSMENT and PLAN  Diagnoses and all orders for this visit:    1. Medicare annual wellness visit, subsequent    2. Screening for depression  -     DEPRESSION SCREEN ANNUAL    3. Encounter for screening mammogram for malignant neoplasm of breast  -     [facility] MAMMO BI SCREENING INCL CAD; Future    4. Rectal cancer ([facility]    5. Severe obesity with body mass index (BMI) of 35.0 to 39.9 with serious comorbidity ([facility] [address] [facility]    6. Controlled type 2 diabetes mellitus without complication, without long-term current use of insulin ([facility] [address]    7. Mixed hyperlipidemia    8. Essential hypertension, benign    9. Encounter for diabetic foot exam ([facility] [address]  -      DIABETES FOOT EXAM    10. Dysuria  -     URINALYSIS W/ RFLX MICROSCOPIC; Future  -     CULTURE, URINE; Future    11.  Bilateral impacted cerumen  -     REMOVAL IMPACTED CERUMEN IRRIGATION/LVG UNILAT

## 2021-04-23 DIAGNOSIS — F34.1 DYSTHYMIA: ICD-10-CM

## 2021-04-26 RX ORDER — ALPRAZOLAM 0.5 MG/1
TABLET ORAL
Qty: 20 TAB | Refills: 0 | Status: SHIPPED | OUTPATIENT
Start: 2021-04-26 | End: 2021-07-30

## 2021-04-29 RX ORDER — AMITRIPTYLINE HYDROCHLORIDE 50 MG/1
TABLET, FILM COATED ORAL
Qty: 360 TAB | Refills: 0 | Status: SHIPPED | OUTPATIENT
Start: 2021-04-29 | End: 2021-08-06

## 2021-05-24 DIAGNOSIS — Z12.31 ENCOUNTER FOR SCREENING MAMMOGRAM FOR MALIGNANT NEOPLASM OF BREAST: ICD-10-CM

## 2021-06-07 DIAGNOSIS — E11.9 CONTROLLED TYPE 2 DIABETES MELLITUS WITHOUT COMPLICATION, WITHOUT LONG-TERM CURRENT USE OF INSULIN (HCC): Primary | ICD-10-CM

## 2021-06-10 ENCOUNTER — TELEPHONE (OUTPATIENT)
Dept: INTERNAL MEDICINE CLINIC | Age: 71
End: 2021-06-10

## 2021-06-10 NOTE — TELEPHONE ENCOUNTER
Pharmacy Progress Note - Telephone Encounter    S/O: Ms. London Andre 72 y.o. female, referred by Dr. Jeff Dejesus MD, was contacted via an outbound telephone call to discuss [facility] coverage today. Verified patients identifiers (name & ) per HIPAA policy.     - After colon cancer, tried Metformin for DM and had severe diarrhea. - Wants to trial medication for weight loss. Saw Wegovy on commercial.  Has asked about coverage with insurance. Medication is not on formulary as it has recently been approved by FDA. Trulicity is a Tier 3 medication (copay $40). Prescription Assistance Eligibility Screening:   - Total # People in Household: 2  - Income: approx >$70,000  - Likely eligible: NO    - Interested in a visit to discuss coverage of Ozempic or Trulicity. A/P:  - Scheduled visit for 21 at 2:30PM  - Patient endorses understanding to the provided information. All questions answered at this time. There are no discontinued medications. No orders of the defined types were placed in this encounter. Beba Dinh, PharmD, [facility]  Clinical Pharmacist Specialist      For Pharmacy Admin Tracking Only     CPA in place:  Yes   Recommendation Provided To: Patient/Caregiver: 1 via Telephone   Intervention Detail: Scheduled Appointment   Gap Closed?: No   Total # of Interventions Recommended: 1   Total # of Interventions Accepted: 1   Intervention Accepted By: Patient/Caregiver: 1   Time Spent (min): 30

## 2021-06-24 ENCOUNTER — OFFICE VISIT (OUTPATIENT)
Dept: INTERNAL MEDICINE CLINIC | Age: 71
End: 2021-06-24

## 2021-06-24 DIAGNOSIS — E11.9 CONTROLLED TYPE 2 DIABETES MELLITUS WITHOUT COMPLICATION, WITHOUT LONG-TERM CURRENT USE OF INSULIN (HCC): Primary | ICD-10-CM

## 2021-06-24 RX ORDER — SEMAGLUTIDE 1.34 MG/ML
0.25 INJECTION, SOLUTION SUBCUTANEOUS
Qty: 1 BOX | Refills: 2 | Status: SHIPPED | OUTPATIENT
Start: 2021-06-24 | End: 2021-09-15

## 2021-06-24 NOTE — PROGRESS NOTES
Pharmacy Progress Note     Pt was referred to PharmD by PCP. Pt arrived to clinic to discuss Ozempic. Pt researched her insurance and found that it was a Tier 3 medication and would cost $45 per month. Pt amenable to cost and would like to start. GLP-1 agonist addition was discussed between PCP and pt for weight loss benefit. Discussed mechanism of action, administration, efficacy, and side effects of med. Pt successfully demonstrated Ozempic administration with use of demo pen. Discussed A1c goals. Pt states that she does not check her BG at home. DM Regimen:  - Glipizide SR 10 mg every day  - Pioglitazone 15 mg every day    Pt was concerned about chronic fatigue and chronic mono that she experiences. Pt state that after expending a lot of energy at work [employer]), she is exhausted for weeks afterwards. She has utilized a home health visit service. During these visits, she was prescribed prednisone x3 visits. After each use of prednisone, pt states she feels considerably more energetic. She wants to know if she should be on long term steroid. During med rec portion of visit, pt reported no longer taking Lexapro (>1 year). Med was d/c'd from EHR list.    A/P    1) T2DM: Most recent A1c is not controlled (goal <7%), but is very close to goal.  BG rdg trend is unknown. Pt unable to tolerate Metformin. Long term use of current agents are associated with weight gain and Glipizide may lose efficacy over time. GLP-1 agonist therapy is beneficial in weight reduction and A1c reduction. Discussed future potential for deprescribing if A1c results are significant.  - START Ozempic 0.25 mg SQ weekly on Mondays  - Continue Glipizide SR 10 mg every day  - Continue Pioglitazone 15 mg every day     2) Fatigue: Pt concerned about significant fatigue that seems to be improved by prednisone. She wants to know if she should be on steroid long term.   - Encouraged pt to reach out to PCP regarding concern. Check: Vitals, Weight and Medication Adherence at the next visit. Medications Discontinued During This Encounter   Medication Reason    escitalopram oxalate (LEXAPRO) 10 mg tablet LIST CLEANUP     Orders Placed This Encounter    semaglutide (Ozempic) 0.25 mg/0.2 mL (2 mg/1.5 mL) sub-q pen     Si.25 mg by SubCUTAneous route every seven (7) days. Dispense:  1 Box     Refill:  2         Shreyas Gee, PharmD, [facility]  Clinical Pharmacist Specialist      For Pharmacy Admin Tracking Only     CPA in place:  Yes   Recommendation Provided To: Patient/Caregiver: 3 via In person   Intervention Detail: Discontinued Rx: 1, reason: Patient Preference, New Rx: 1, reason: Needs Additional Therapy and Scheduled Appointment   Gap Closed?: No   Total # of Interventions Recommended: 3   Total # of Interventions Accepted: 3   Intervention Accepted By: Patient/Caregiver: 3   Time Spent (min): 60

## 2021-07-27 DIAGNOSIS — F34.1 DYSTHYMIA: ICD-10-CM

## 2021-07-30 RX ORDER — GLIPIZIDE 10 MG/1
TABLET, FILM COATED, EXTENDED RELEASE ORAL
Qty: 90 TABLET | Refills: 0 | Status: SHIPPED | OUTPATIENT
Start: 2021-07-30 | End: 2021-10-30 | Stop reason: ALTCHOICE

## 2021-07-30 RX ORDER — ALPRAZOLAM 0.5 MG/1
TABLET ORAL
Qty: 20 TABLET | Refills: 0 | Status: SHIPPED | OUTPATIENT
Start: 2021-07-30 | End: 2022-01-11

## 2021-08-06 RX ORDER — AMITRIPTYLINE HYDROCHLORIDE 50 MG/1
TABLET, FILM COATED ORAL
Qty: 360 TABLET | Refills: 0 | Status: SHIPPED | OUTPATIENT
Start: 2021-08-06 | End: 2021-11-26 | Stop reason: SDUPTHER

## 2021-08-23 ENCOUNTER — TELEPHONE (OUTPATIENT)
Dept: INTERNAL MEDICINE CLINIC | Age: 71
End: 2021-08-23

## 2021-08-23 NOTE — TELEPHONE ENCOUNTER
Kwame Pride () [phone] (H)     .       PT dropped off urine specimen at [facility] and requesting that orders be changed to [facility] ASAP and they be sent to [facility]    Also requesting a call back to discuss wife's health - she has been sick for over a week and there are no appointments this week
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
## 2021-08-24 DIAGNOSIS — R50.81 FEVER IN OTHER DISEASES: Primary | ICD-10-CM

## 2021-08-24 NOTE — TELEPHONE ENCOUNTER
Returned call to patient's . He stated he will take the specimen to [facility] health lab in [facility].

## 2021-09-09 ENCOUNTER — OFFICE VISIT (OUTPATIENT)
Dept: INTERNAL MEDICINE CLINIC | Age: 71
End: 2021-09-09
Payer: MEDICARE

## 2021-09-09 VITALS
RESPIRATION RATE: 20 BRPM | WEIGHT: 200.4 LBS | BODY MASS INDEX: 34.21 KG/M2 | OXYGEN SATURATION: 98 % | TEMPERATURE: 97.8 F | SYSTOLIC BLOOD PRESSURE: 136 MMHG | DIASTOLIC BLOOD PRESSURE: 84 MMHG | HEIGHT: 64 IN | HEART RATE: 93 BPM

## 2021-09-09 DIAGNOSIS — N30.00 ACUTE CYSTITIS WITHOUT HEMATURIA: ICD-10-CM

## 2021-09-09 DIAGNOSIS — B34.9 VIRAL SYNDROME: Primary | ICD-10-CM

## 2021-09-09 DIAGNOSIS — J45.20 MILD INTERMITTENT REACTIVE AIRWAY DISEASE WITHOUT COMPLICATION: ICD-10-CM

## 2021-09-09 PROCEDURE — 1101F PT FALLS ASSESS-DOCD LE1/YR: CPT | Performed by: INTERNAL MEDICINE

## 2021-09-09 PROCEDURE — G9711 PT HX TOT COL OR COLON CA: HCPCS | Performed by: INTERNAL MEDICINE

## 2021-09-09 PROCEDURE — 99214 OFFICE O/P EST MOD 30 MIN: CPT | Performed by: INTERNAL MEDICINE

## 2021-09-09 PROCEDURE — G8399 PT W/DXA RESULTS DOCUMENT: HCPCS | Performed by: INTERNAL MEDICINE

## 2021-09-09 PROCEDURE — G8417 CALC BMI ABV UP PARAM F/U: HCPCS | Performed by: INTERNAL MEDICINE

## 2021-09-09 PROCEDURE — G0463 HOSPITAL OUTPT CLINIC VISIT: HCPCS | Performed by: INTERNAL MEDICINE

## 2021-09-09 PROCEDURE — G9899 SCRN MAM PERF RSLTS DOC: HCPCS | Performed by: INTERNAL MEDICINE

## 2021-09-09 PROCEDURE — G8536 NO DOC ELDER MAL SCRN: HCPCS | Performed by: INTERNAL MEDICINE

## 2021-09-09 PROCEDURE — G8427 DOCREV CUR MEDS BY ELIG CLIN: HCPCS | Performed by: INTERNAL MEDICINE

## 2021-09-09 PROCEDURE — G8752 SYS BP LESS 140: HCPCS | Performed by: INTERNAL MEDICINE

## 2021-09-09 PROCEDURE — G8754 DIAS BP LESS 90: HCPCS | Performed by: INTERNAL MEDICINE

## 2021-09-09 PROCEDURE — G9717 DOC PT DX DEP/BP F/U NT REQ: HCPCS | Performed by: INTERNAL MEDICINE

## 2021-09-09 PROCEDURE — 1090F PRES/ABSN URINE INCON ASSESS: CPT | Performed by: INTERNAL MEDICINE

## 2021-09-09 RX ORDER — ALBUTEROL SULFATE 90 UG/1
1 AEROSOL, METERED RESPIRATORY (INHALATION)
Qty: 18 G | Refills: 1 | Status: SHIPPED | OUTPATIENT
Start: 2021-09-09

## 2021-09-09 NOTE — PROGRESS NOTES
HISTORY OF PRESENT ILLNESS  Erik Soliz is a 72 y.o. female. Shortness of Breath  The history is provided by the patient (started with illness sx on 8/16, seen by [facility]  on 8/18. Eventually dx with UTi and treated). This is a new problem. The problem has not changed since onset. Associated symptoms include wheezing. Pertinent negatives include no fever, no cough and no chest pain. She has tried beta-agonist inhalers for the symptoms. The treatment provided moderate relief. Associated medical issues do not include asthma or chronic lung disease. Review of Systems   Constitutional: Positive for chills, fatigue and malaise/fatigue. Negative for fever. Respiratory: Positive for shortness of breath and wheezing. Negative for cough. Cardiovascular: Negative for chest pain. Physical Exam  Vitals and nursing note reviewed. Constitutional:       General: She is not in acute distress. Cardiovascular:      Rate and Rhythm: Normal rate and regular rhythm. Heart sounds: No murmur heard. No friction rub. No gallop. Pulmonary:      Effort: Pulmonary effort is normal.      Breath sounds: Normal breath sounds. No wheezing. ASSESSMENT and PLAN  Diagnoses and all orders for this visit:    1. Viral syndrome  -     CBC WITH AUTOMATED DIFF; Future    2. Mild intermittent reactive airway disease without complication  -     CBC WITH AUTOMATED DIFF; Future    3. Acute cystitis without hematuria  -     URINALYSIS W/ RFLX MICROSCOPIC; Future  -     CULTURE, URINE; Future    Other orders  -     albuterol (PROVENTIL HFA, VENTOLIN HFA, PROAIR HFA) 90 mcg/actuation inhaler; Take 1 Puff by inhalation every six (6) hours as needed for Wheezing.

## 2021-09-10 DIAGNOSIS — I10 ESSENTIAL HYPERTENSION, BENIGN: ICD-10-CM

## 2021-09-11 DIAGNOSIS — G44.209 MUSCLE CONTRACTION HEADACHE: ICD-10-CM

## 2021-09-11 RX ORDER — DICLOFENAC SODIUM 75 MG/1
TABLET, DELAYED RELEASE ORAL
Qty: 14 TABLET | Refills: 1 | Status: SHIPPED | OUTPATIENT
Start: 2021-09-11

## 2021-09-11 RX ORDER — AMLODIPINE AND BENAZEPRIL HYDROCHLORIDE 5; 20 MG/1; MG/1
CAPSULE ORAL
Qty: 90 CAPSULE | Refills: 1 | Status: SHIPPED | OUTPATIENT
Start: 2021-09-11 | End: 2022-07-17 | Stop reason: SDUPTHER

## 2021-09-13 RX ORDER — SULFAMETHOXAZOLE AND TRIMETHOPRIM 800; 160 MG/1; MG/1
1 TABLET ORAL 2 TIMES DAILY
Qty: 10 TABLET | Refills: 0 | Status: SHIPPED | OUTPATIENT
Start: 2021-09-13 | End: 2021-09-18

## 2021-09-15 ENCOUNTER — TELEPHONE (OUTPATIENT)
Dept: INTERNAL MEDICINE CLINIC | Age: 71
End: 2021-09-15

## 2021-09-15 ENCOUNTER — VIRTUAL VISIT (OUTPATIENT)
Dept: INTERNAL MEDICINE CLINIC | Age: 71
End: 2021-09-15

## 2021-09-15 DIAGNOSIS — E11.9 CONTROLLED TYPE 2 DIABETES MELLITUS WITHOUT COMPLICATION, WITHOUT LONG-TERM CURRENT USE OF INSULIN (HCC): Primary | ICD-10-CM

## 2021-09-15 RX ORDER — LANCETS
EACH MISCELLANEOUS
Qty: 100 EACH | Refills: 11 | Status: SHIPPED | OUTPATIENT
Start: 2021-09-15 | End: 2021-11-05 | Stop reason: SDUPTHER

## 2021-09-15 RX ORDER — INSULIN PUMP SYRINGE, 3 ML
EACH MISCELLANEOUS
Qty: 1 KIT | Refills: 0 | Status: SHIPPED | OUTPATIENT
Start: 2021-09-15

## 2021-09-15 RX ORDER — LANCING DEVICE
EACH MISCELLANEOUS
Qty: 1 EACH | Refills: 0 | Status: SHIPPED | OUTPATIENT
Start: 2021-09-15

## 2021-09-15 RX ORDER — SEMAGLUTIDE 1.34 MG/ML
0.5 INJECTION, SOLUTION SUBCUTANEOUS
Qty: 1 BOX | Refills: 2 | Status: SHIPPED | OUTPATIENT
Start: 2021-09-15

## 2021-09-15 NOTE — TELEPHONE ENCOUNTER
Pharmacy Progress Note - Telephone Encounter    S/O: Ms. Karen Morrow 72 y.o. female, referred by Dr. Kishor Kelley MD, was contacted via an outbound telephone call to discuss Visit switched to virtual today. Verified patients identifiers (name & ) per HIPAA policy. - Pt requests visit to be switched to virtual    A/P:  - Pt rescheduled for virtual appt. - Patient endorses understanding to the provided information. All questions answered at this time. There are no discontinued medications. No orders of the defined types were placed in this encounter. Selina Wheat PharmD, [facility]  Clinical Pharmacist Specialist      For Pharmacy Admin Tracking Only     CPA in place:  Yes   Recommendation Provided To: Patient/Caregiver: 1 via Telephone   Intervention Detail: Scheduled Appointment   Gap Closed?: No   Intervention Accepted By: Patient/Caregiver: 1   Time Spent (min): 5

## 2021-09-15 NOTE — PROGRESS NOTES
Pharmacy Progress Note - Diabetes Management    S/O: Ms. Maynor Ortiz is a 72 y.o. female, referred by Dr. Danita Fabry, MD, with a PMH of HTN, Migraine, chronic nonalcoholic liver disease, S8DT, chronic fatigue, stasis edema, dysthymia, HLD, obesity, was seen today for diabetes management. Patient's last A1c was 7.0% (March 2021) which was elevated from A1c 6.9% (Sept 2019). Interim update: Pt was last seen by PCP on 9/9/21 for acute illness. Pt had fever, fatigue, SOB. COVID test negative. Provided albuterol inhaler. During last visit with this writer on 6/24/21 Ozempic 0.25 mg weekly was started to aid in weight reduction and A1c control. Pt was seen today via [employer], a real-time audio-visual platform for f/up on Ozempic. Reports having injected Ozempic for 2 months. Denies side effects. Tolerates very well. Noted decreased appetite. Eating much less. Reports a weight loss of 11 lbs since starting agent.       SHx:  - [address] [place] [address]    Current anti-hyperglycemic regimen includes:    - Pioglitazone 15 mg every day  - Glipizide SR 10 mg every day  - Ozempic 0.25 mg Qweek    ROS:  Today, Pt endorses:  - Symptoms of Hyperglycemia: none  - Symptoms of Hypoglycemia: none    Self Monitoring Blood Glucose (SMBG) or CGM:  - Brought in home glucometer/blood glucose log/CGM reader today:  no  - Checks BG: never    Nutrition:  - appetite suppressed  - eating much less - if going out, will eat half of what was ordered    Diabetes Health Maintenance:  · ASCVD Risk Factors: Age, Total cholesterol, High LDL, Blood Pressure, Diabetes, Smoking History and Lack of statin therapy  · ASA therapy:  ASA 81 mg  · ACE/ARB therapy: Benazepril 20 mg   · Optimized statin therapy: none - myalgias on Rosuvastatin - currently on fenofibrate 200 mg   · The 10-year ASCVD risk score (Va Barrios, et al., 2013) is: 26.6%    · LDL: 183 mg/dL (3/26/21)  · Nicotine dependence: No  · Last eye exam: 11/2/15  · Last foot exam: 4/22/21  · Last influenza vaccine: 11/6/18  · Last Pneumovax 23 vaccine: 9/20/17  · Last Prevnar-13 vaccine: none  · Hepatitis B Series: unknown   · COVID-19 vaccine: 1/24/21, 2/24/21      Vitals: Wt Readings from Last 3 Encounters:   09/09/21 200 lb 6.4 oz (90.9 kg)   04/22/21 211 lb (95.7 kg)   10/28/20 218 lb 3.2 oz (99 kg)     BP Readings from Last 3 Encounters:   09/09/21 136/84   04/22/21 122/82   10/28/20 (!) 147/87     Pulse Readings from Last 3 Encounters:   09/09/21 93   04/22/21 93   10/28/20 80       Past Medical History:   Diagnosis Date    Cancer [facility])     rectal surgery/chemo/xrt    Chronic fatigue     Colon polyps     Diabetes ([address]     type 2    Fatty liver     Headache(784.0)     migraine    Hypercholesterolemia     Hypertension     PCOS (polycystic ovarian syndrome)     Unspecified adverse effect of anesthesia     \"vagal Response during cholecystectomy requiring ICU admission\" no problems with other surgeries     Allergies   Allergen Reactions    Clindamycin Other (comments)     colitis    Codeine Other (comments)     mental    Crestor [Rosuvastatin] Myalgia    Metformin Diarrhea    Pcn [Penicillins] Hives    Vicodin [Hydrocodone-Acetaminophen] Other (comments)     hallucinations       Current Outpatient Medications   Medication Sig    trimethoprim-sulfamethoxazole (BACTRIM DS, SEPTRA DS) 160-800 mg per tablet Take 1 Tablet by mouth two (2) times a day for 5 days.  amLODIPine-benazepril (LOTREL) 5-20 mg per capsule TAKE ONE CAPSULE BY MOUTH DAILY    diclofenac EC (VOLTAREN) 75 mg EC tablet TAKE ONE TABLET BY MOUTH TWO TIMES A DAY FOR HEAD PAIN FOR 7 DAYS    albuterol (PROVENTIL HFA, VENTOLIN HFA, PROAIR HFA) 90 mcg/actuation inhaler Take 1 Puff by inhalation every six (6) hours as needed for Wheezing.     amitriptyline (ELAVIL) 50 mg tablet TAKE 4 TABLETS BY MOUTH NIGHTLY    glipiZIDE SR (GLUCOTROL XL) 10 mg CR tablet TAKE ONE TABLET BY MOUTH DAILY    ALPRAZolam Severo Rhymes) 0.5 mg tablet TAKE ONE TABLET BY MOUTH THREE TIMES A DAY AS NEEDED FOR ANXIETY    semaglutide (Ozempic) 0.25 mg/0.2 mL (2 mg/1.5 mL) sub-q pen 0.25 mg by SubCUTAneous route every seven (7) days.  pioglitazone (ACTOS) 15 mg tablet 1 PO every day    methocarbamoL (ROBAXIN) 500 mg tablet TAKE ONE TABLET BY MOUTH NIGHTLY AS NEEDED FOR MUSCLE SPASM(S)    fenofibrate micronized (LOFIBRA) 200 mg capsule TAKE ONE CAPSULE BY MOUTH DAILY (GENERIC FOR LOFIBRA)    butalbital-acetaminophen-caffeine (FIORICET, ESGIC) -40 mg per tablet TAKE ONE TABLET BY MOUTH EVERY 4 HOURS AS NEEDED FOR HEADACHE    triamcinolone acetonide (NASACORT AQ NA) by Nasal route daily as needed.  dicyclomine (BENTYL) 10 mg capsule Take 10 mg by mouth daily.  omega-3 fatty acids-vitamin e (FISH OIL) 1,000 mg Cap Take 1,000 mg by mouth four (4) times daily. (Patient taking differently: Take 4,000 mg by mouth four (4) times daily.)    aspirin delayed-release 81 mg tablet Take 162 mg by mouth. 2 po qd      No current facility-administered medications for this visit.      Lab Results   Component Value Date/Time    Sodium CANCELED 03/26/2021 12:00 AM    Sodium 137 03/26/2021 12:00 AM    Potassium CANCELED 03/26/2021 12:00 AM    Potassium 5.1 03/26/2021 12:00 AM    Chloride CANCELED 03/26/2021 12:00 AM    Chloride 101 03/26/2021 12:00 AM    CO2 CANCELED 03/26/2021 12:00 AM    CO2 24 03/26/2021 12:00 AM    Anion gap 7 12/06/2013 10:53 AM    Glucose CANCELED 03/26/2021 12:00 AM    Glucose 185 (H) 03/26/2021 12:00 AM    BUN CANCELED 03/26/2021 12:00 AM    BUN 17 03/26/2021 12:00 AM    Creatinine CANCELED 03/26/2021 12:00 AM    Creatinine 0.83 03/26/2021 12:00 AM    BUN/Creatinine ratio 20 03/26/2021 12:00 AM    GFR est AA 83 03/26/2021 12:00 AM    GFR est non-AA 72 03/26/2021 12:00 AM    Calcium CANCELED 03/26/2021 12:00 AM    Calcium 9.7 03/26/2021 12:00 AM    Bilirubin, total CANCELED 03/26/2021 12:00 AM    Bilirubin, total 0.5 03/26/2021 12:00 AM    Alk. phosphatase CANCELED 03/26/2021 12:00 AM    Alk. phosphatase 70 03/26/2021 12:00 AM    Protein, total CANCELED 03/26/2021 12:00 AM    Protein, total 7.2 03/26/2021 12:00 AM    Albumin CANCELED 03/26/2021 12:00 AM    Albumin 4.5 03/26/2021 12:00 AM    Globulin 4.1 (H) 10/03/2012 11:00 AM    A-G Ratio 1.3 09/03/2019 01:03 PM    ALT (SGPT) CANCELED 03/26/2021 12:00 AM    ALT (SGPT) 44 (H) 03/26/2021 12:00 AM     Lab Results   Component Value Date/Time    Cholesterol, total CANCELED 03/26/2021 12:00 AM    Cholesterol, total 263 (H) 03/26/2021 12:00 AM    HDL Cholesterol CANCELED 03/26/2021 12:00 AM    HDL Cholesterol 43 03/26/2021 12:00 AM    LDL,Direct 168 (H) 06/02/2011 11:38 AM    LDL, calculated 183 (H) 03/26/2021 12:00 AM    LDL, calculated 165 (H) 02/19/2019 03:28 PM    VLDL, calculated CANCELED 03/26/2021 12:00 AM    VLDL, calculated 37 03/26/2021 12:00 AM    VLDL, calculated 70 (H) 02/19/2019 03:28 PM    Triglyceride CANCELED 03/26/2021 12:00 AM    Triglyceride 197 (H) 03/26/2021 12:00 AM    CHOL/HDL Ratio 4.5 09/20/2010 09:40 AM     Lab Results   Component Value Date/Time    WBC 7.3 09/10/2021 11:05 AM    HGB 12.1 09/10/2021 11:05 AM    HCT 38.0 09/10/2021 11:05 AM    PLATELET 381 (H) 00/98/0083 11:05 AM    MCV 93.6 09/10/2021 11:05 AM       Lab Results   Component Value Date/Time    Microalb/Creat ratio (ug/mg creat.) 91 (H) 03/26/2021 12:00 AM       Lab Results   Component Value Date/Time    Hemoglobin A1c CANCELED 03/26/2021 12:00 AM    Hemoglobin A1c 7.0 (H) 03/26/2021 12:00 AM    Hemoglobin A1c 6.9 (H) 09/03/2019 01:03 PM     Hemoglobin A1c (POC)   Date Value Ref Range Status   03/26/2014 9.3+ % Final        CrCl cannot be calculated (This lab value cannot be used to calculate CrCl because it is not a number: CANCELED). A/P:    1) T2DM/Weight Management: Per ADA guidelines, Pt's A1c is not at goal of < 7%. Current SMBG(s)/CGM trend is unknown.   Tolerating newly started Ozempic very well. Has experienced approx 5% weight reduction in 2 months - medically significant. Denies sx of hypoglycemia. Will increase Ozempic dose. Encouraged pt to check BG every day in order to evaluate opportunity for deprescribing other DM agents. Changes discussed with PCP  - INCREASE Ozempic to 0.5 mg Qweek on   - Continue Pioglitazone 15 mg every day  - Continue Glipizide SR 10 mg every day  - Sent in BG testing supplies  - Encouraged pt to test BG every day     2) HTN: BP is historically not at goal of < 130/80. Currently taking CCB and ACEi. Evidence of microalbuminuria (). On first line agent for renal protection and HTN tx in DM. Will have BP reevaluated during f/up visit with PCP  - Continue Amlodipine/Benazepril 5-20 mg every day      3) Primary Prevention ASCVD: Per ADA guidelines, pts age 43-69 yrs are recommended for statin therapy. Currently taking Fenofibrate and fish oil as pt has experienced myalgias with statin therapy. LDL >100 mg/dL. As pt is experiencing significant weight loss, will not recommend starting statin at this time. Will reevaluate lipids when lab is due. Medication reconciliation was completed during the visit. Medications Discontinued During This Encounter   Medication Reason    semaglutide (Ozempic) 0.25 mg/0.2 mL (2 mg/1.5 mL) sub-q pen      Orders Placed This Encounter    semaglutide (Ozempic) 0.25 mg or 0.5 mg/dose (2 mg/1.5 ml) subq pen     Si.5 mg by SubCUTAneous route every seven (7) days. Dispense:  1 Box     Refill:  2    Blood-Glucose Meter monitoring kit     Sig: Check BG once per day - fasting or 1-2 hours after a meal.  Pharmacist to choose device based on insurance. Dx E11.9     Dispense:  1 Kit     Refill:  0    glucose blood VI test strips (ASCENSIA AUTODISC VI, ONE TOUCH ULTRA TEST VI) strip     Sig: Check BG once per day - fasting or 1-2 hours after a meal.  Pharmacist to choose supply based on insurance.   Dx E11.9     Dispense:  100 Strip     Refill:  2    lancets misc     Sig: Check BG once per day - fasting or 1-2 hours after a meal.  Pharmacist to choose supply based on insurance. Dx E11.9     Dispense:  100 Each     Refill:  11    Lancing Device misc     Sig: Check BG once per day - fasting or 1-2 hours after a meal.  Pharmacist to choose supply based on insurance. Dx E11.9     Dispense:  1 Each     Refill:  0       Patient verbalized understanding of the information presented and all of the patients questions were answered. AVS was handed to the patient. Patient advised to call the office with any additional questions or concerns. Notifications of recommendations will be sent to Dr. Ana Laura Ramirez MD for review. Patient will return to clinic in 4 week(s) for follow up. Venessa Hurtado, PharmD, [facility] [place]  Clinical Pharmacist Specialist          For Pharmacy Admin Tracking Only     CPA in place:  Yes   Recommendation Provided To: Provider: 5 via Verbally to provider  and Patient/Caregiver: 1 via Virtual Visit   Intervention Detail: Dose Adjustment: 1, reason: Therapy Optimization, New Rx: 4, reason: Needs Additional Therapy and Scheduled Appointment   Gap Closed?: No   Intervention Accepted By: Provider: 5 and Patient/Caregiver: 1   Time Spent (min): 30

## 2021-10-14 ENCOUNTER — TELEPHONE (OUTPATIENT)
Dept: INTERNAL MEDICINE CLINIC | Age: 71
End: 2021-10-14

## 2021-10-14 NOTE — TELEPHONE ENCOUNTER
Reason for call: Pt requesting call back regarding her and her 's Ozempic, they have questions about \"a side effect\"    Is this a new problem: yes     Date of last appointment:  9/15/2021     Can we respond via [employer]: no    Best call back number: [phone]

## 2021-10-15 ENCOUNTER — TELEPHONE (OUTPATIENT)
Dept: INTERNAL MEDICINE CLINIC | Age: 71
End: 2021-10-15

## 2021-10-15 NOTE — TELEPHONE ENCOUNTER
Pharmacy Progress Note - Telephone Call    Ms. Rohan Wilcox 80 y.o. was contacted via an outbound telephone call regarding Ozempic side effect today. A voicemail was left for patient to return my call. This writer's work mobile number was provided as a callback contact - [phone].       Yanni Caicedo, PharmD, [facility] [place]  Clinical Pharmacist Specialist        For Pharmacy Admin Tracking Only     Time Spent (min): 5

## 2021-10-27 ENCOUNTER — OFFICE VISIT (OUTPATIENT)
Dept: INTERNAL MEDICINE CLINIC | Age: 71
End: 2021-10-27
Payer: MEDICARE

## 2021-10-27 VITALS
BODY MASS INDEX: 32.44 KG/M2 | RESPIRATION RATE: 16 BRPM | HEART RATE: 99 BPM | WEIGHT: 190 LBS | DIASTOLIC BLOOD PRESSURE: 86 MMHG | SYSTOLIC BLOOD PRESSURE: 125 MMHG | OXYGEN SATURATION: 97 % | HEIGHT: 64 IN | TEMPERATURE: 97.1 F

## 2021-10-27 DIAGNOSIS — Z23 NEEDS FLU SHOT: ICD-10-CM

## 2021-10-27 DIAGNOSIS — C20 RECTAL CANCER (HCC): ICD-10-CM

## 2021-10-27 DIAGNOSIS — K59.1 FUNCTIONAL DIARRHEA: ICD-10-CM

## 2021-10-27 DIAGNOSIS — E11.9 CONTROLLED TYPE 2 DIABETES MELLITUS WITHOUT COMPLICATION, WITHOUT LONG-TERM CURRENT USE OF INSULIN (HCC): Primary | ICD-10-CM

## 2021-10-27 DIAGNOSIS — I10 ESSENTIAL HYPERTENSION, BENIGN: ICD-10-CM

## 2021-10-27 DIAGNOSIS — E78.2 MIXED HYPERLIPIDEMIA: ICD-10-CM

## 2021-10-27 DIAGNOSIS — E11.9 CONTROLLED TYPE 2 DIABETES MELLITUS WITHOUT COMPLICATION, WITHOUT LONG-TERM CURRENT USE OF INSULIN (HCC): ICD-10-CM

## 2021-10-27 LAB
ALBUMIN SERPL-MCNC: 2.6 G/DL (ref 3.5–5)
ALBUMIN/GLOB SERPL: 0.5 {RATIO} (ref 1.1–2.2)
ALP SERPL-CCNC: 111 U/L (ref 45–117)
ALT SERPL-CCNC: 22 U/L (ref 12–78)
ANION GAP SERPL CALC-SCNC: 7 MMOL/L (ref 5–15)
AST SERPL-CCNC: 16 U/L (ref 15–37)
BASOPHILS # BLD: 0.1 K/UL (ref 0–0.1)
BASOPHILS NFR BLD: 1 % (ref 0–1)
BILIRUB SERPL-MCNC: 0.4 MG/DL (ref 0.2–1)
BUN SERPL-MCNC: 11 MG/DL (ref 6–20)
BUN/CREAT SERPL: 11 (ref 12–20)
CALCIUM SERPL-MCNC: 9.5 MG/DL (ref 8.5–10.1)
CHLORIDE SERPL-SCNC: 99 MMOL/L (ref 97–108)
CHOLEST SERPL-MCNC: 186 MG/DL
CO2 SERPL-SCNC: 25 MMOL/L (ref 21–32)
CREAT SERPL-MCNC: 0.99 MG/DL (ref 0.55–1.02)
DIFFERENTIAL METHOD BLD: ABNORMAL
EOSINOPHIL # BLD: 0.1 K/UL (ref 0–0.4)
EOSINOPHIL NFR BLD: 1 % (ref 0–7)
ERYTHROCYTE [DISTWIDTH] IN BLOOD BY AUTOMATED COUNT: 13.4 % (ref 11.5–14.5)
EST. AVERAGE GLUCOSE BLD GHB EST-MCNC: 126 MG/DL
GLOBULIN SER CALC-MCNC: 5.1 G/DL (ref 2–4)
GLUCOSE SERPL-MCNC: 141 MG/DL (ref 65–100)
HBA1C MFR BLD: 6 % (ref 4–5.6)
HCT VFR BLD AUTO: 35.4 % (ref 35–47)
HDLC SERPL-MCNC: 29 MG/DL
HDLC SERPL: 6.4 {RATIO} (ref 0–5)
HGB BLD-MCNC: 11.2 G/DL (ref 11.5–16)
IMM GRANULOCYTES # BLD AUTO: 0.2 K/UL (ref 0–0.04)
IMM GRANULOCYTES NFR BLD AUTO: 2 % (ref 0–0.5)
LDLC SERPL CALC-MCNC: 110 MG/DL (ref 0–100)
LYMPHOCYTES # BLD: 1.3 K/UL (ref 0.8–3.5)
LYMPHOCYTES NFR BLD: 11 % (ref 12–49)
MCH RBC QN AUTO: 28.8 PG (ref 26–34)
MCHC RBC AUTO-ENTMCNC: 31.6 G/DL (ref 30–36.5)
MCV RBC AUTO: 91 FL (ref 80–99)
MONOCYTES # BLD: 0.6 K/UL (ref 0–1)
MONOCYTES NFR BLD: 5 % (ref 5–13)
NEUTS SEG # BLD: 9.2 K/UL (ref 1.8–8)
NEUTS SEG NFR BLD: 80 % (ref 32–75)
NRBC # BLD: 0 K/UL (ref 0–0.01)
NRBC BLD-RTO: 0 PER 100 WBC
PLATELET # BLD AUTO: 651 K/UL (ref 150–400)
PMV BLD AUTO: 8.8 FL (ref 8.9–12.9)
POTASSIUM SERPL-SCNC: 4.7 MMOL/L (ref 3.5–5.1)
PROT SERPL-MCNC: 7.7 G/DL (ref 6.4–8.2)
RBC # BLD AUTO: 3.89 M/UL (ref 3.8–5.2)
RBC MORPH BLD: ABNORMAL
SODIUM SERPL-SCNC: 131 MMOL/L (ref 136–145)
TRIGL SERPL-MCNC: 235 MG/DL (ref ?–150)
VLDLC SERPL CALC-MCNC: 47 MG/DL
WBC # BLD AUTO: 11.5 K/UL (ref 3.6–11)

## 2021-10-27 PROCEDURE — G8417 CALC BMI ABV UP PARAM F/U: HCPCS | Performed by: INTERNAL MEDICINE

## 2021-10-27 PROCEDURE — G8399 PT W/DXA RESULTS DOCUMENT: HCPCS | Performed by: INTERNAL MEDICINE

## 2021-10-27 PROCEDURE — 90694 VACC AIIV4 NO PRSRV 0.5ML IM: CPT | Performed by: INTERNAL MEDICINE

## 2021-10-27 PROCEDURE — G9711 PT HX TOT COL OR COLON CA: HCPCS | Performed by: INTERNAL MEDICINE

## 2021-10-27 PROCEDURE — G8754 DIAS BP LESS 90: HCPCS | Performed by: INTERNAL MEDICINE

## 2021-10-27 PROCEDURE — G9899 SCRN MAM PERF RSLTS DOC: HCPCS | Performed by: INTERNAL MEDICINE

## 2021-10-27 PROCEDURE — G8427 DOCREV CUR MEDS BY ELIG CLIN: HCPCS | Performed by: INTERNAL MEDICINE

## 2021-10-27 PROCEDURE — 3044F HG A1C LEVEL LT 7.0%: CPT | Performed by: INTERNAL MEDICINE

## 2021-10-27 PROCEDURE — 2022F DILAT RTA XM EVC RTNOPTHY: CPT | Performed by: INTERNAL MEDICINE

## 2021-10-27 PROCEDURE — 1101F PT FALLS ASSESS-DOCD LE1/YR: CPT | Performed by: INTERNAL MEDICINE

## 2021-10-27 PROCEDURE — G0463 HOSPITAL OUTPT CLINIC VISIT: HCPCS | Performed by: INTERNAL MEDICINE

## 2021-10-27 PROCEDURE — G8536 NO DOC ELDER MAL SCRN: HCPCS | Performed by: INTERNAL MEDICINE

## 2021-10-27 PROCEDURE — G9717 DOC PT DX DEP/BP F/U NT REQ: HCPCS | Performed by: INTERNAL MEDICINE

## 2021-10-27 PROCEDURE — G8752 SYS BP LESS 140: HCPCS | Performed by: INTERNAL MEDICINE

## 2021-10-27 PROCEDURE — 1090F PRES/ABSN URINE INCON ASSESS: CPT | Performed by: INTERNAL MEDICINE

## 2021-10-27 PROCEDURE — 99214 OFFICE O/P EST MOD 30 MIN: CPT | Performed by: INTERNAL MEDICINE

## 2021-10-27 NOTE — PROGRESS NOTES
HISTORY OF PRESENT ILLNESS  Boubacar Palomino is a 80 y.o. female. HPI  Assessment: Kenyetta is seen today for evaluation of a new problem, diarrhea as well as follow up of chronic problems. 1) Diarrhea. This seems to have started with the increased dosage of Ozempic. It has been ongoing for six weeks. She has had some decrease. She has tried Imodium that has helped. She stopped Ozempic for four weeks. She has arranged for an evaluation with her GI specialist. She more recently restarted Ozempic without a resumption of symptoms. I have indicated to her that we should check stool studies if her diarrhea returns and I have given her orders for these if that is the case. 2) Hypertension. Stable on current regimen. 3) Diabetes, hyperlipidemia. Due for routine labs to assess status. Review of Systems   Constitutional: Positive for malaise/fatigue. Negative for chills and fever. Gastrointestinal: Positive for abdominal pain and diarrhea. Negative for blood in stool and melena. Physical Exam  Vitals and nursing note reviewed. Neck:      Vascular: No carotid bruit. Cardiovascular:      Rate and Rhythm: Normal rate and regular rhythm. Heart sounds: No murmur heard. No friction rub. No gallop. Pulmonary:      Effort: Pulmonary effort is normal. No respiratory distress. Breath sounds: Normal breath sounds. Abdominal:      General: Bowel sounds are normal.      Palpations: Abdomen is soft. Tenderness: There is no guarding or rebound. Musculoskeletal:      Right lower leg: No edema. Left lower leg: No edema. ASSESSMENT and PLAN  Diagnoses and all orders for this visit:    1. Controlled type 2 diabetes mellitus without complication, without long-term current use of insulin (HCC)  -     HEMOGLOBIN A1C WITH EAG; Future    2. Needs flu shot  -     FLU (FLUAD QUAD INFLUENZA VACCINE,QUAD,ADJUVANTED)    3. Essential hypertension, benign    4. Rectal cancer ([facility] [address]    5.  Mixed hyperlipidemia  -     METABOLIC PANEL, COMPREHENSIVE; Future  -     CBC WITH AUTOMATED DIFF; Future  -     LIPID PANEL; Future    6. Functional diarrhea  -     WBC, STOOL; Future  -     OVA & PARASITES, STOOL; Future  -     CULTURE, STOOL  -     C DIFFICILE TOXIN A & B BY EIA  -     CBC WITH AUTOMATED DIFF;  Future

## 2021-10-27 NOTE — PROGRESS NOTES
Mari Mejia  is a 80 y.o.  female  who present for routine immunizations. Prior to vaccine administration: Consent was obtained. Risks and adverse reactions were discussed. The patient was provided the VIS and they were given an opportunity to ask questions; all questions were addressed. She  denies any symptoms, reactions or allergies that would exclude them from being immunized today. There were no adverse reactions observed post vaccination. Patient was advised to seek medical or call the office with any questions or concerns post vaccination. Patient verbalized understanding.   [address] [place] [address]

## 2021-10-27 NOTE — PROGRESS NOTES
Verified name and birth date for privacy precautions. Chart reviewed in preparation for today's visit. Chief Complaint   Patient presents with    Hypertension          Health Maintenance Due   Topic    DTaP/Tdap/Td series (1 - Tdap)    Shingrix Vaccine Age 49> (1 of 2)    Eye Exam Retinal or Dilated     Flu Vaccine (1)         Wt Readings from Last 3 Encounters:   10/27/21 190 lb (86.2 kg)   09/09/21 200 lb 6.4 oz (90.9 kg)   04/22/21 211 lb (95.7 kg)     Temp Readings from Last 3 Encounters:   10/27/21 97.1 °F (36.2 °C) (Temporal)   09/09/21 97.8 °F (36.6 °C) (Temporal)   04/22/21 97.3 °F (36.3 °C) (Temporal)     BP Readings from Last 3 Encounters:   10/27/21 125/86   09/09/21 136/84   04/22/21 122/82     Pulse Readings from Last 3 Encounters:   10/27/21 99   09/09/21 93   04/22/21 93         Learning Assessment:  :     Learning Assessment 6/11/2018 3/26/2014   PRIMARY LEARNER Patient Patient   HIGHEST LEVEL OF EDUCATION - PRIMARY LEARNER  - 4 YEARS OF COLLEGE   BARRIERS PRIMARY LEARNER - NONE   CO-LEARNER CAREGIVER - No   PRIMARY LANGUAGE ENGLISH ENGLISH   LEARNER PREFERENCE PRIMARY READING OTHER (COMMENT)     VIDEOS -   ANSWERED BY patient self   RELATIONSHIP SELF SELF       Depression Screening:  :     3 most recent PHQ Screens 10/27/2021   Little interest or pleasure in doing things Not at all   Feeling down, depressed, irritable, or hopeless Not at all   Total Score PHQ 2 0       Fall Risk Assessment:  :     Fall Risk Assessment, last 12 mths 10/27/2021   Able to walk? Yes   Fall in past 12 months? 0   Do you feel unsteady? 0   Are you worried about falling 0       Abuse Screening:  :     Abuse Screening Questionnaire 10/27/2021 4/22/2021 1/29/2021   Do you ever feel afraid of your partner? N N N   Are you in a relationship with someone who physically or mentally threatens you? N N N   Is it safe for you to go home?  Clois Lips

## 2021-10-27 NOTE — PATIENT INSTRUCTIONS
Vaccine Information Statement    Influenza (Flu) Vaccine (Inactivated or Recombinant): What You Need to Know    Many vaccine information statements are available in Mongolian and other languages. See www.immunize.org/vis. Hojas de información sobre vacunas están disponibles en español y en muchos otros idiomas. Visite www.immunize.org/vis. 1. Why get vaccinated? Influenza vaccine can prevent influenza (flu). Flu is a contagious disease that spreads around the [facility] every year, usually between October and May. Anyone can get the flu, but it is more dangerous for some people. Infants and young children, people 72 years and older, pregnant people, and people with certain health conditions or a weakened immune system are at greatest risk of flu complications. Pneumonia, bronchitis, sinus infections, and ear infections are examples of flu-related complications. If you have a medical condition, such as heart disease, cancer, or diabetes, flu can make it worse. Flu can cause fever and chills, sore throat, muscle aches, fatigue, cough, headache, and runny or stuffy nose. Some people may have vomiting and diarrhea, though this is more common in children than adults. In an average year, thousands of people in the [facility] die from flu, and many more are hospitalized. Flu vaccine prevents millions of illnesses and flu-related visits to the doctor each year. 2. Influenza vaccines     CDC recommends everyone 6 months and older get vaccinated every flu season. Children 6 months through 6years of age may need 2 doses during a single flu season. Everyone else needs only 1 dose each flu season. It takes about 2 weeks for protection to develop after vaccination. There are many flu viruses, and they are always changing. Each year a new flu vaccine is made to protect against the influenza viruses believed to be likely to cause disease in the upcoming flu season.  Even when the vaccine doesnt exactly match these viruses, it may still provide some protection. Influenza vaccine does not cause flu. Influenza vaccine may be given at the same time as other vaccines. 3. Talk with your health care provider    Tell your vaccination provider if the person getting the vaccine:   Has had an allergic reaction after a previous dose of influenza vaccine, or has any severe, life-threatening allergies    Has ever had Guillain-Barré Syndrome (also called GBS)    In some cases, your health care provider may decide to postpone influenza vaccination until a future visit. Influenza vaccine can be administered at any time during pregnancy. People who are or will be pregnant during influenza season should receive inactivated influenza vaccine. People with minor illnesses, such as a cold, may be vaccinated. People who are moderately or severely ill should usually wait until they recover before getting influenza vaccine. Your health care provider can give you more information. 4. Risks of a vaccine reaction     Soreness, redness, and swelling where the shot is given, fever, muscle aches, and headache can happen after influenza vaccination.  There may be a very small increased risk of Guillain-Barré Syndrome (GBS) after inactivated influenza vaccine (the flu shot). Tammy Speck children who get the flu shot along with pneumococcal vaccine (PCV13) and/or DTaP vaccine at the same time might be slightly more likely to have a seizure caused by fever. Tell your health care provider if a child who is getting flu vaccine has ever had a seizure. People sometimes faint after medical procedures, including vaccination. Tell your provider if you feel dizzy or have vision changes or ringing in the ears. As with any medicine, there is a very remote chance of a vaccine causing a severe allergic reaction, other serious injury, or death. 5. What if there is a serious problem?     An allergic reaction could occur after the vaccinated person leaves the clinic. If you see signs of a severe allergic reaction (hives, swelling of the face and throat, difficulty breathing, a fast heartbeat, dizziness, or weakness), call 9-1-1 and get the person to the nearest hospital.    For other signs that concern you, call your health care provider. Adverse reactions should be reported to the Vaccine Adverse Event Reporting System (VAERS). Your health care provider will usually file this report, or you can do it yourself. Visit the VAERS website at www.vaers. [facility].gov or call 0-566.566.6997. VAERS is only for reporting reactions, and VAERS staff members do not give medical advice. 6. The National Vaccine Injury Compensation Program    The [facility] Vaccine Injury Compensation Program (VICP) is a federal program that was created to compensate people who may have been injured by certain vaccines. Claims regarding alleged injury or death due to vaccination have a time limit for filing, which may be as short as two years. Visit the VICP website at www.[facility].gov/vaccinecompensation or call 3-540.390.2857 to learn about the program and about filing a claim. 7. How can I learn more?  Ask your health care provider.  Call your local or state health department.  Visit the website of the Food and Drug Administration (FDA) for vaccine package inserts and additional information at www.fda.gov/vaccines-blood-biologics/vaccines.  Contact the Centers for Disease Control and Prevention (CDC):  - Call 7-295.386.8181 (1-800-CDC-INFO) or  - Visit CDCs influenza website at www.cdc.gov/flu. Vaccine Information Statement   Inactivated Influenza Vaccine   8/6/2021  42 GILLES Funez [id number]   Department of Health and Human Services  Centers for Disease Control and Prevention    Office Use Only

## 2021-11-05 ENCOUNTER — TELEPHONE (OUTPATIENT)
Dept: FAMILY MEDICINE CLINIC | Age: 71
End: 2021-11-05

## 2021-11-05 DIAGNOSIS — E11.9 CONTROLLED TYPE 2 DIABETES MELLITUS WITHOUT COMPLICATION, WITHOUT LONG-TERM CURRENT USE OF INSULIN (HCC): Primary | ICD-10-CM

## 2021-11-05 RX ORDER — LANCETS
EACH MISCELLANEOUS
Qty: 100 EACH | Refills: 11 | Status: SHIPPED | OUTPATIENT
Start: 2021-11-05

## 2021-11-05 RX ORDER — PEN NEEDLE, DIABETIC 30 GX3/16"
NEEDLE, DISPOSABLE MISCELLANEOUS
Qty: 1 EACH | Refills: 11 | Status: SHIPPED | OUTPATIENT
Start: 2021-11-05

## 2021-11-05 NOTE — TELEPHONE ENCOUNTER
Pharmacy Progress Note - Telephone Encounter    S/O: Ms. Jeanna Patiño 80 y.o. female, referred by Dr. Gaston Arambula MD, was contacted via an outbound telephone call to discuss DM supplies needed today. Verified patients identifiers (name & ) per HIPAA policy.     - This writer received a voicemail from pt's  requesting lancets and pen needles for Ozempic. - This writer called back to see if there was anything else required. No answer. Voicemail was left. A/P:  - Sent in requested supplies  - Patient endorses understanding to the provided information. All questions answered at this time. Medications Discontinued During This Encounter   Medication Reason    glucose blood VI test strips (ASCENSIA AUTODISC VI, ONE TOUCH ULTRA TEST VI) strip REORDER    lancets misc REORDER     Orders Placed This Encounter    lancets misc     Sig: Check BG once per day - fasting or 1-2 hours after a meal.  Pharmacist to choose supply based on insurance. Dx E11.9     Dispense:  100 Each     Refill:  11    glucose blood VI test strips (ASCENSIA AUTODISC VI, ONE TOUCH ULTRA TEST VI) strip     Sig: Check BG once per day - fasting or 1-2 hours after a meal.  Pharmacist to choose supply based on insurance. Dx E11.9     Dispense:  100 Strip     Refill:  2    Insulin Needles, Disposable, 31 gauge x 5/16\" ndle     Sig: Use to inject Ozempic once weekly     Dispense:  1 Each     Refill:  Yue 30, PharmD, Kary Baptiste [address] in place:  Yes   Recommendation Provided To: Patient/Caregiver: 2 via Telephone   Intervention Detail: New Rx: 1, reason: Needs Additional Therapy and Refill(s) Provided   Intervention Accepted By: Patient/Caregiver: 2   Time Spent (min): 10

## 2021-11-26 RX ORDER — AMITRIPTYLINE HYDROCHLORIDE 50 MG/1
TABLET, FILM COATED ORAL
Qty: 360 TABLET | Refills: 0 | Status: SHIPPED | OUTPATIENT
Start: 2021-11-26 | End: 2022-02-19

## 2022-01-06 DIAGNOSIS — F34.1 DYSTHYMIA: ICD-10-CM

## 2022-01-11 RX ORDER — ALPRAZOLAM 0.5 MG/1
TABLET ORAL
Qty: 20 TABLET | Refills: 0 | Status: SHIPPED | OUTPATIENT
Start: 2022-01-11 | End: 2022-06-17

## 2022-02-19 RX ORDER — AMITRIPTYLINE HYDROCHLORIDE 50 MG/1
TABLET, FILM COATED ORAL
Qty: 360 TABLET | Refills: 0 | Status: SHIPPED | OUTPATIENT
Start: 2022-02-19 | End: 2022-06-17

## 2022-02-25 ENCOUNTER — TELEPHONE (OUTPATIENT)
Dept: INTERNAL MEDICINE CLINIC | Age: 72
End: 2022-02-25

## 2022-02-25 NOTE — TELEPHONE ENCOUNTER
Reason for call:  Please call in the patient Fioricet ASAP.  She has a really bad headache     Is this a new problem: yes     Date of last appointment:  1/26/2022     Can we respond via [employer]: no    Best call back number: [phone]

## 2022-04-07 DIAGNOSIS — E78.2 MIXED HYPERLIPIDEMIA: ICD-10-CM

## 2022-04-07 RX ORDER — FENOFIBRATE 200 MG/1
CAPSULE ORAL
Qty: 30 CAPSULE | Refills: 4 | Status: SHIPPED | OUTPATIENT
Start: 2022-04-07

## 2022-06-13 DIAGNOSIS — F34.1 DYSTHYMIA: ICD-10-CM

## 2022-06-14 ENCOUNTER — TELEPHONE (OUTPATIENT)
Dept: INTERNAL MEDICINE CLINIC | Age: 72
End: 2022-06-14

## 2022-06-14 NOTE — TELEPHONE ENCOUNTER
----- Message from Anny Barbosa MD sent at 6/14/2022  5:26 PM EDT -----  Regarding: acs  Josiah Lynn texted at 5 25.  Please see what;s up

## 2022-06-14 NOTE — TELEPHONE ENCOUNTER
Spoke with pt - advised her  had sent message to MD with concerns for her. Pt states she is fine - no issues.  not home at time - had gone to [facility]. She told me to call his mobile. Called pt's  mobile - left message I was calling in regards to his message to MD. I will try back once more before I leave for the day.

## 2022-06-14 NOTE — TELEPHONE ENCOUNTER
Spoke with pt's  Jessica Monroy (on HIPAA). He states he is very worried about his wife. She has \"lost the will to live. \" He states she lays in bed all day. Does not want to do anything. She is forgetting things and also repeating herself a lot. She constantly yelling at him. He feels he can not help her. Several days ago he states she wrapped phone  cord around her neck and wanted to end it. He has tried to get her to go to ER - she refuses. Asked him if he feared for himself - her harming him? He states no - she's too weak to do anything. Advised him I have discussed this with Dr Vero Cabrera. He wants the both of them to come in for an appt. Scheduled on 6/16/22 at 5 pm. Advised him if he feels she is going to harm herself to call 911. He states he understands.    Will forward to MD.

## 2022-06-14 NOTE — TELEPHONE ENCOUNTER
----- Message from Phyllis Kim MD sent at 6/14/2022  5:26 PM EDT -----  Regarding: acs   Shane texted at 5 25.  Please see what;s up

## 2022-06-16 ENCOUNTER — TELEPHONE (OUTPATIENT)
Dept: INTERNAL MEDICINE CLINIC | Age: 72
End: 2022-06-16

## 2022-06-16 NOTE — TELEPHONE ENCOUNTER
Reason for call:  pt's  calling regarding his wife's appt today at 5:00, he says that she seems to be doing better and does not want to come in , he wonders if he could come in instead for her?     Is this a new problem: yes     Date of last appointment:  1/26/2022     Can we respond via [employer]: no    Best call back number:    [employer] ([facility]) [phone] (H)

## 2022-06-17 RX ORDER — AMITRIPTYLINE HYDROCHLORIDE 50 MG/1
TABLET, FILM COATED ORAL
Qty: 360 TABLET | Refills: 0 | Status: SHIPPED | OUTPATIENT
Start: 2022-06-17 | End: 2022-08-20

## 2022-06-17 RX ORDER — ALPRAZOLAM 0.5 MG/1
TABLET ORAL
Qty: 20 TABLET | Refills: 0 | Status: SHIPPED | OUTPATIENT
Start: 2022-06-17

## 2022-06-19 ENCOUNTER — TELEPHONE (OUTPATIENT)
Dept: INTERNAL MEDICINE CLINIC | Age: 72
End: 2022-06-19

## 2022-06-19 NOTE — TELEPHONE ENCOUNTER
Called to see if she was ok.  had called. She reports going through a rough time last week but was feeling better and getting back into her routine.      Denies suicidal thoughts and she sounds completely fine on the call    Will follow up soon for routine care

## 2022-07-15 DIAGNOSIS — I10 ESSENTIAL HYPERTENSION, BENIGN: ICD-10-CM

## 2022-07-17 RX ORDER — AMLODIPINE AND BENAZEPRIL HYDROCHLORIDE 5; 20 MG/1; MG/1
CAPSULE ORAL
Qty: 90 CAPSULE | Refills: 1 | Status: SHIPPED | OUTPATIENT
Start: 2022-07-17

## 2022-08-20 RX ORDER — AMITRIPTYLINE HYDROCHLORIDE 50 MG/1
TABLET, FILM COATED ORAL
Qty: 360 TABLET | Refills: 0 | Status: SHIPPED | OUTPATIENT
Start: 2022-08-20

## 2022-08-25 ENCOUNTER — OFFICE VISIT (OUTPATIENT)
Dept: INTERNAL MEDICINE CLINIC | Age: 72
End: 2022-08-25
Payer: MEDICARE

## 2022-08-25 VITALS
HEIGHT: 64 IN | TEMPERATURE: 97.5 F | WEIGHT: 198 LBS | DIASTOLIC BLOOD PRESSURE: 86 MMHG | SYSTOLIC BLOOD PRESSURE: 137 MMHG | HEART RATE: 84 BPM | OXYGEN SATURATION: 97 % | RESPIRATION RATE: 16 BRPM | BODY MASS INDEX: 33.8 KG/M2

## 2022-08-25 DIAGNOSIS — L71.9 ROSACEA: ICD-10-CM

## 2022-08-25 DIAGNOSIS — E11.9 CONTROLLED TYPE 2 DIABETES MELLITUS WITHOUT COMPLICATION, WITHOUT LONG-TERM CURRENT USE OF INSULIN (HCC): ICD-10-CM

## 2022-08-25 DIAGNOSIS — I10 ESSENTIAL HYPERTENSION, BENIGN: Primary | ICD-10-CM

## 2022-08-25 DIAGNOSIS — C20 RECTAL CANCER (HCC): ICD-10-CM

## 2022-08-25 DIAGNOSIS — N18.31 STAGE 3A CHRONIC KIDNEY DISEASE (HCC): ICD-10-CM

## 2022-08-25 DIAGNOSIS — E66.01 SEVERE OBESITY WITH BODY MASS INDEX (BMI) OF 35.0 TO 39.9 WITH SERIOUS COMORBIDITY (HCC): ICD-10-CM

## 2022-08-25 DIAGNOSIS — E78.2 MIXED HYPERLIPIDEMIA: ICD-10-CM

## 2022-08-25 PROBLEM — N18.30 CHRONIC RENAL DISEASE, STAGE III (HCC): Status: ACTIVE | Noted: 2022-08-25

## 2022-08-25 PROCEDURE — G8399 PT W/DXA RESULTS DOCUMENT: HCPCS | Performed by: INTERNAL MEDICINE

## 2022-08-25 PROCEDURE — 1090F PRES/ABSN URINE INCON ASSESS: CPT | Performed by: INTERNAL MEDICINE

## 2022-08-25 PROCEDURE — G8417 CALC BMI ABV UP PARAM F/U: HCPCS | Performed by: INTERNAL MEDICINE

## 2022-08-25 PROCEDURE — G0463 HOSPITAL OUTPT CLINIC VISIT: HCPCS | Performed by: INTERNAL MEDICINE

## 2022-08-25 PROCEDURE — G8427 DOCREV CUR MEDS BY ELIG CLIN: HCPCS | Performed by: INTERNAL MEDICINE

## 2022-08-25 PROCEDURE — G8536 NO DOC ELDER MAL SCRN: HCPCS | Performed by: INTERNAL MEDICINE

## 2022-08-25 PROCEDURE — 2022F DILAT RTA XM EVC RTNOPTHY: CPT | Performed by: INTERNAL MEDICINE

## 2022-08-25 PROCEDURE — G9899 SCRN MAM PERF RSLTS DOC: HCPCS | Performed by: INTERNAL MEDICINE

## 2022-08-25 PROCEDURE — G8752 SYS BP LESS 140: HCPCS | Performed by: INTERNAL MEDICINE

## 2022-08-25 PROCEDURE — G9711 PT HX TOT COL OR COLON CA: HCPCS | Performed by: INTERNAL MEDICINE

## 2022-08-25 PROCEDURE — 1101F PT FALLS ASSESS-DOCD LE1/YR: CPT | Performed by: INTERNAL MEDICINE

## 2022-08-25 PROCEDURE — 3044F HG A1C LEVEL LT 7.0%: CPT | Performed by: INTERNAL MEDICINE

## 2022-08-25 PROCEDURE — G9717 DOC PT DX DEP/BP F/U NT REQ: HCPCS | Performed by: INTERNAL MEDICINE

## 2022-08-25 PROCEDURE — 99214 OFFICE O/P EST MOD 30 MIN: CPT | Performed by: INTERNAL MEDICINE

## 2022-08-25 PROCEDURE — G8754 DIAS BP LESS 90: HCPCS | Performed by: INTERNAL MEDICINE

## 2022-08-25 RX ORDER — METRONIDAZOLE 7.5 MG/G
GEL TOPICAL 2 TIMES DAILY
Qty: 60 G | Refills: 1 | Status: SHIPPED | OUTPATIENT
Start: 2022-08-25

## 2022-08-25 NOTE — PROGRESS NOTES
HISTORY OF PRESENT ILLNESS  Saurabh Celeste is a 80 y.o. female. Hypertension   Pertinent negatives include no chest pain, no palpitations and no PND. Assessment:  Kenyetta is seen today for follow up of diabetes and other problems. 1. Hypertension, stable on current prescription med regimen. 2. Diabetes, hyperlipidemia. Due for labs. 3. Colon cancer, up to date with specialist follow up. She is about 20 years out from her diagnosis. Review of Systems   Constitutional:  Negative for chills, fever and weight loss. Respiratory: Negative. Cardiovascular:  Negative for chest pain, palpitations, leg swelling and PND. Musculoskeletal:  Negative for myalgias. Skin:  Positive for rash. Facial , c/w mild rosacea   Neurological:  Negative for focal weakness. Physical Exam  Vitals and nursing note reviewed. Neck:      Vascular: No carotid bruit. Cardiovascular:      Rate and Rhythm: Normal rate and regular rhythm. Heart sounds: No murmur heard. No friction rub. No gallop. Pulmonary:      Effort: Pulmonary effort is normal. No respiratory distress. Breath sounds: Normal breath sounds. Musculoskeletal:      Comments: Trace stasis edema bilateral lower extremities. ASSESSMENT and PLAN  Diagnoses and all orders for this visit:    1. Essential hypertension, benign    2. Stage 3a chronic kidney disease ([address]    3. Rectal cancer ([address]    4. Controlled type 2 diabetes mellitus without complication, without long-term current use of insulin (HCC)  -     HEMOGLOBIN A1C WITH EAG; Future  -     CBC WITH AUTOMATED DIFF; Future  -     METABOLIC PANEL, COMPREHENSIVE; Future    5. Severe obesity with body mass index (BMI) of 35.0 to 39.9 with serious comorbidity ([address]    6. Rosacea  -     metroNIDAZOLE (METROGEL) 0.75 % topical gel; Apply  to affected area two (2) times a day. For facial rash    7. Mixed hyperlipidemia  -     LIPID PANEL;  Future

## 2022-08-25 NOTE — PROGRESS NOTES
Verified name and birth date for privacy precautions. Chart reviewed in preparation for today's visit. Chief Complaint   Patient presents with    Hypertension          Health Maintenance Due   Topic    DTaP/Tdap/Td series (1 - Tdap)    Shingrix Vaccine Age 49> (1 of 2)    Eye Exam Retinal or Dilated     Pneumococcal 65+ years (2 - PCV)    COVID-19 Vaccine (4 - Booster for Moderna series)    MICROALBUMIN Q1     Foot Exam Q1     Medicare Yearly Exam          Wt Readings from Last 3 Encounters:   08/25/22 198 lb (89.8 kg)   10/27/21 190 lb (86.2 kg)   09/09/21 200 lb 6.4 oz (90.9 kg)     Temp Readings from Last 3 Encounters:   08/25/22 97.5 °F (36.4 °C)   10/27/21 97.1 °F (36.2 °C) (Temporal)   09/09/21 97.8 °F (36.6 °C) (Temporal)     BP Readings from Last 3 Encounters:   08/25/22 137/86   10/27/21 125/86   09/09/21 136/84     Pulse Readings from Last 3 Encounters:   08/25/22 84   10/27/21 99   09/09/21 93         Learning Assessment:  :     Learning Assessment 6/11/2018 3/26/2014   PRIMARY LEARNER Patient Patient   HIGHEST LEVEL OF EDUCATION - PRIMARY LEARNER  - 4 YEARS OF COLLEGE   BARRIERS PRIMARY LEARNER - NONE   CO-LEARNER CAREGIVER - No   PRIMARY LANGUAGE ENGLISH ENGLISH   LEARNER PREFERENCE PRIMARY READING OTHER (COMMENT)     VIDEOS -   ANSWERED BY patient self   RELATIONSHIP SELF SELF       Depression Screening:  :     3 most recent PHQ Screens 8/25/2022   Little interest or pleasure in doing things Not at all   Feeling down, depressed, irritable, or hopeless Not at all   Total Score PHQ 2 0       Fall Risk Assessment:  :     Fall Risk Assessment, last 12 mths 8/25/2022   Able to walk? Yes   Fall in past 12 months? 0   Do you feel unsteady? 0   Are you worried about falling 0       Abuse Screening:  :     Abuse Screening Questionnaire 8/25/2022 10/27/2021 4/22/2021 1/29/2021   Do you ever feel afraid of your partner?  N N N N   Are you in a relationship with someone who physically or mentally threatens you? N N N N   Is it safe for you to go home?  Ron Beltrán

## 2022-08-30 DIAGNOSIS — E11.9 CONTROLLED TYPE 2 DIABETES MELLITUS WITHOUT COMPLICATION, WITHOUT LONG-TERM CURRENT USE OF INSULIN (HCC): ICD-10-CM

## 2022-08-30 RX ORDER — PIOGLITAZONEHYDROCHLORIDE 15 MG/1
TABLET ORAL
Qty: 30 TABLET | Refills: 5 | Status: SHIPPED | OUTPATIENT
Start: 2022-08-30

## 2022-09-12 ENCOUNTER — TELEPHONE (OUTPATIENT)
Dept: INTERNAL MEDICINE CLINIC | Age: 72
End: 2022-09-12

## 2022-09-12 NOTE — TELEPHONE ENCOUNTER
----- Message from Alfonso Marin sent at 2022  1:57 PM EDT -----  Subject: Message to Provider    QUESTIONS  Information for Provider?  home needs death certificate signed for   Roman Cespedes  ---------------------------------------------------------------------------  --------------  [address] [employer]  [phone]; Do not leave any message, patient will call back for answer  ---------------------------------------------------------------------------  --------------  SCRIPT ANSWERS  Relationship to Patient? Third Party  Third Party Type? Other  Other Third Party Type?  Home  Representative Name?  rep

## 2022-09-16 ENCOUNTER — TELEPHONE (OUTPATIENT)
Dept: INTERNAL MEDICINE CLINIC | Age: 72
End: 2022-09-16

## 2022-09-16 NOTE — TELEPHONE ENCOUNTER
Anthony Huerta with [address] [place] [address] regarding pt's death certificate. Stated that they have been waiting on [facility] to sign, so that they can cremate the pt. Informed that [facility] is no longer at the practice or practicing in the state. Rocio Ramirez requesting the name of another provider to send the death certificate to in the  [employer] system to list possible cause and sign death certificate. Stated that pt  at home. Requesting call back before the weekend.     Call back number:  [phone]

## 2022-09-23 NOTE — TELEPHONE ENCOUNTER
When I spoke with Dr Bulmaro Bartholomew last week he told me that you both were aware of this and taking care of it . Thank you.

## 2022-09-23 NOTE — TELEPHONE ENCOUNTER
----- Message from Basilio Hanna sent at 2022  1:10 PM EDT -----  Subject: Message to Provider    QUESTIONS  Information for Provider? Patient's death certificate needs to be signed   and had been sent on  and then again . Patient  at home of   natural causes. Please sign as soon as possible.   ---------------------------------------------------------------------------  --------------  Bibiana Fort[employer]  [phone]; OK to leave message on voicemail  ---------------------------------------------------------------------------  --------------  SCRIPT ANSWERS  Relationship to Patient? Third Party  Third Party Type? Other  Other Third Party Type? [employer]   Representative Name?  Jesús Guerrero

## 2022-09-27 ENCOUNTER — TELEPHONE (OUTPATIENT)
Dept: INTERNAL MEDICINE CLINIC | Age: 72
End: 2022-09-27

## 2023-12-15 NOTE — TELEPHONE ENCOUNTER
Pt needs refills to new Pharm - wants 90 days  Pt only has two days left.   Pt - [phone] REASON FOR VISIT:  Chief Complaint   Patient presents with    Office Visit    Glaucoma       HISTORY OF PRESENT ILLNESS:  76 year old female is here for f/u visit. Pt feels that vision has improved since she had triple bypass surgery 5 weeks ago. Pt is using Cosopt BID left. Uses gtts regularly. Used them last night and this AM.      ASSESSMENT:  1. 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 Ocular hypertension, right    3. Anatomical narrow angle of both eyes    4. Pseudophakia of both eyes    5. Posterior capsular opacification of both eyes, not obscuring vision           PLAN:  No orders of the defined types were placed in this encounter.      FOLLOWUP:  Return in about 6 months (around 6/15/2024).       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    S/P left atrial appendage ligation 11/7/2023       Past Surgical History:   Procedure Laterality Date    Cataract extraction w/  intraocular lens implant Left 06/01/2021    Cataract extraction w/ intraocular lens implant Right 05/18/2021    Coronary artery bypass graft  11/07/2023    x3    Left atrial appendage clip ligation  11/07/2023    Tubal ligation         Social History     Socioeconomic History    Marital status: /Civil Union     Spouse name: Not on file    Number of children: Not on file    Years of education: Not on file    Highest education level: Not on file   Occupational History    Not on file   Tobacco Use    Smoking status: Never    Smokeless tobacco: Never   Substance and Sexual Activity    Alcohol use: No     Alcohol/week: 0.0 standard drinks of alcohol    Drug use: No    Sexual activity: Not on file   Other Topics Concern    Not on file   Social History Narrative    Not on  file     Social Determinants of Health     Financial Resource Strain: Low Risk  (11/3/2023)    Financial Resource Strain     Social Determinants: Financial Resource Strain: None   Food Insecurity: No Food Insecurity (11/3/2023)    Food Insecurity     Social Determinants: Food Insecurity: Never   Transportation Needs: No Transportation Needs (11/12/2023)    OASIS : Transportation     Lack of Transportation (Medical): No     Lack of Transportation (Non-Medical): No     Patient Unable or Declines to Respond: No   Physical Activity: Not on file   Stress: Not on file   Social Connections: Feeling Socially Integrated (11/12/2023)    OASIS : Social Isolation     Frequency of experiencing loneliness or isolation: Never   Intimate Partner Violence: Not At Risk (11/3/2023)    Intimate Partner Violence     Social Determinants: Intimate Partner Violence Past Fear: No     Social Determinants: Intimate Partner Violence Current Fear: No       family history includes Asthma in her father; Cancer in her father; Cataracts in her father; Diabetes in her mother; Heart disease in her mother.    ALLERGIES:   Allergen Reactions    Aspirin HIVES and SWELLING       Family History:  I reviewed the technician's history as outlined in EPIC; there are no additions.    Pertinent systemic medications:  See EPIC for full medication list.      A1C:    Hemoglobin A1C (%)   Date Value   11/02/2023 8.0 (H)       Eye medications:   Cosopt BID left.       Past Ocular History:    wears glasses   CE with IOL Right 05/18/2021  CE with IOL Left 06/01/2021, monovision for near  CAGG left  Ocular hypertension right  Anatomical narrow angles both eyes, YAG PI right 06/19/2020, YAG PI left 05/25/2018  Diabetes (II), on oral meds.      Family Ocular History:    Glaucoma:  no    Retinal detachment:  no    Macular degeneration:  no    Amblyopia or strabismus:  no       Impression and Plan:  1. Chronic Angle closure Glaucoma, Moderate, left: No FH. Thin  CCT. Had progression between 2017 and 2018. Last HVF was stable but ON OCT appeared worse in left. IOP fairly low. NO disc heme. Cont on Cosopt BID left. If next testing shows consistent progression or has disc heme on exam, will add a med to lower IOP further. Will schedule for testing in June.   (CCT = 506, 497  ON OCT: 06/07/2023; 84, 63 (was 84, 66); normal average RNFL right, thin left with sup and nf thinning left; stable right, decrease in average RNFL left  HVF: 06/07/2023; nonspecific defects right; very early sup nasal step, early inf nasal step/inf arcuate left; left worse compared to 2022 but stable compared to 2021; MD -0.24, -3.62.  Gonio: 2/21/2020  Glaucoma med start date: March 2016  Inadequate response with Latanoprost.   Reviewed old records, IOP in 2011 (not on gtts at the time) was 18, 24. HVF done in March 2016 was normal R, early inf > sup arcuate L, ON OCT done in March 2016 showed average RNFL 82, 65 with sup and inf thinning L. IOP after starting gtts was 15.)  2. Ocular Hypertension, right: IOP normal today. H/o high readings in the past. Will cont on no gtts.   3. Anatomical Narrow Angles, both eyes: S/p YAG PI both eyes. PIs patent.   4. Pseudophakia, both eyes: Mild PCO both eyes, not visually sig. Will continue to monitor.